# Patient Record
Sex: FEMALE | Race: BLACK OR AFRICAN AMERICAN | Employment: FULL TIME | URBAN - METROPOLITAN AREA
[De-identification: names, ages, dates, MRNs, and addresses within clinical notes are randomized per-mention and may not be internally consistent; named-entity substitution may affect disease eponyms.]

---

## 2019-06-13 PROBLEM — G44.209 TENSION TYPE HEADACHE: Status: ACTIVE | Noted: 2017-12-07

## 2019-06-13 PROBLEM — Z82.49 FAMILY HISTORY OF ANEURYSM OF BLOOD VESSEL OF BRAIN: Status: ACTIVE | Noted: 2017-12-07

## 2019-06-13 PROBLEM — L65.9 ALOPECIA: Status: ACTIVE | Noted: 2017-04-07

## 2019-08-05 ENCOUNTER — TELEPHONE (OUTPATIENT)
Dept: OBGYN CLINIC | Age: 49
End: 2019-08-05

## 2019-08-16 ENCOUNTER — HOSPITAL ENCOUNTER (OUTPATIENT)
Age: 49
Setting detail: SPECIMEN
Discharge: HOME | End: 2019-08-16
Payer: COMMERCIAL

## 2019-08-16 DIAGNOSIS — Z00.00 WELL ADULT EXAM: ICD-10-CM

## 2019-08-16 DIAGNOSIS — Z13.220 SCREENING, LIPID: ICD-10-CM

## 2019-08-16 DIAGNOSIS — Z13.1 SCREENING FOR DIABETES MELLITUS: ICD-10-CM

## 2019-08-16 LAB
ALBUMIN SERPL-MCNC: 4.3 G/DL (ref 3.5–5.2)
ALBUMIN/GLOBULIN RATIO: 1.5 (ref 1–2.5)
ALP BLD-CCNC: 75 U/L (ref 35–104)
ALT SERPL-CCNC: 33 U/L (ref 5–33)
ANION GAP SERPL CALCULATED.3IONS-SCNC: 10 MMOL/L (ref 9–17)
AST SERPL-CCNC: 23 U/L
BILIRUB SERPL-MCNC: 0.3 MG/DL (ref 0.3–1.2)
BUN BLDV-MCNC: 10 MG/DL (ref 6–20)
BUN/CREAT BLD: NORMAL (ref 9–20)
CALCIUM SERPL-MCNC: 9.6 MG/DL (ref 8.6–10.4)
CHLORIDE BLD-SCNC: 102 MMOL/L (ref 98–107)
CHOLESTEROL/HDL RATIO: 3.3
CHOLESTEROL: 187 MG/DL
CO2: 26 MMOL/L (ref 20–31)
CREAT SERPL-MCNC: 0.72 MG/DL (ref 0.5–0.9)
ESTIMATED AVERAGE GLUCOSE: 126 MG/DL
GFR AFRICAN AMERICAN: >60 ML/MIN
GFR NON-AFRICAN AMERICAN: >60 ML/MIN
GFR SERPL CREATININE-BSD FRML MDRD: NORMAL ML/MIN/{1.73_M2}
GFR SERPL CREATININE-BSD FRML MDRD: NORMAL ML/MIN/{1.73_M2}
GLUCOSE BLD-MCNC: 91 MG/DL (ref 70–99)
HBA1C MFR BLD: 6 % (ref 4–6)
HDLC SERPL-MCNC: 56 MG/DL
LDL CHOLESTEROL: 115 MG/DL (ref 0–130)
POTASSIUM SERPL-SCNC: 4.3 MMOL/L (ref 3.7–5.3)
SODIUM BLD-SCNC: 138 MMOL/L (ref 135–144)
TOTAL PROTEIN: 7.2 G/DL (ref 6.4–8.3)
TRIGL SERPL-MCNC: 78 MG/DL
VLDLC SERPL CALC-MCNC: NORMAL MG/DL (ref 1–30)

## 2020-01-22 ENCOUNTER — HOSPITAL ENCOUNTER (OUTPATIENT)
Age: 50
Setting detail: SPECIMEN
Discharge: HOME | End: 2020-01-22
Payer: COMMERCIAL

## 2020-01-22 LAB
ABSOLUTE EOS #: 0.1 K/UL (ref 0–0.44)
ABSOLUTE IMMATURE GRANULOCYTE: <0.03 K/UL (ref 0–0.3)
ABSOLUTE LYMPH #: 1.11 K/UL (ref 1.1–3.7)
ABSOLUTE MONO #: 0.29 K/UL (ref 0.1–1.2)
BASOPHILS # BLD: 1 % (ref 0–2)
BASOPHILS ABSOLUTE: 0.03 K/UL (ref 0–0.2)
DIFFERENTIAL TYPE: ABNORMAL
EOSINOPHILS RELATIVE PERCENT: 3 % (ref 1–4)
HCT VFR BLD CALC: 41.6 % (ref 36.3–47.1)
HEMOGLOBIN: 12.7 G/DL (ref 11.9–15.1)
IMMATURE GRANULOCYTES: 0 %
LYMPHOCYTES # BLD: 28 % (ref 24–43)
MCH RBC QN AUTO: 22.1 PG (ref 25.2–33.5)
MCHC RBC AUTO-ENTMCNC: 30.5 G/DL (ref 28.4–34.8)
MCV RBC AUTO: 72.5 FL (ref 82.6–102.9)
MONOCYTES # BLD: 7 % (ref 3–12)
NRBC AUTOMATED: 0 PER 100 WBC
PDW BLD-RTO: 17.3 % (ref 11.8–14.4)
PLATELET # BLD: ABNORMAL K/UL (ref 138–453)
PLATELET ESTIMATE: ABNORMAL
PLATELET, FLUORESCENCE: 306 K/UL (ref 138–453)
PLATELET, IMMATURE FRACTION: 4.9 % (ref 1.1–10.3)
PMV BLD AUTO: ABNORMAL FL (ref 8.1–13.5)
RBC # BLD: 5.74 M/UL (ref 3.95–5.11)
RBC # BLD: ABNORMAL 10*6/UL
SEG NEUTROPHILS: 61 % (ref 36–65)
SEGMENTED NEUTROPHILS ABSOLUTE COUNT: 2.41 K/UL (ref 1.5–8.1)
TSH SERPL DL<=0.05 MIU/L-ACNC: 0.69 MIU/L (ref 0.3–5)
WBC # BLD: 4 K/UL (ref 3.5–11.3)
WBC # BLD: ABNORMAL 10*3/UL

## 2021-03-29 ENCOUNTER — HOSPITAL ENCOUNTER (OUTPATIENT)
Age: 51
Setting detail: SPECIMEN
Discharge: HOME | End: 2021-03-29
Payer: COMMERCIAL

## 2021-04-02 LAB — CYTOLOGY REPORT: NORMAL

## 2022-11-09 ENCOUNTER — HOSPITAL ENCOUNTER (OUTPATIENT)
Dept: WOMENS IMAGING | Age: 52
Discharge: HOME | End: 2022-11-11
Payer: COMMERCIAL

## 2022-11-09 ENCOUNTER — HOSPITAL ENCOUNTER (OUTPATIENT)
Age: 52
Setting detail: SPECIMEN
Discharge: HOME | End: 2022-11-09

## 2022-11-09 DIAGNOSIS — Z12.31 ENCOUNTER FOR SCREENING MAMMOGRAM FOR MALIGNANT NEOPLASM OF BREAST: ICD-10-CM

## 2022-11-09 DIAGNOSIS — Z00.00 WELL ADULT HEALTH CHECK: ICD-10-CM

## 2022-11-09 DIAGNOSIS — I10 ESSENTIAL HYPERTENSION: ICD-10-CM

## 2022-11-09 LAB
ALBUMIN SERPL-MCNC: 4.4 G/DL (ref 3.5–5.2)
ALBUMIN/GLOBULIN RATIO: 1.8 (ref 1–2.5)
ALP BLD-CCNC: 80 U/L (ref 35–104)
ALT SERPL-CCNC: 13 U/L (ref 5–33)
ANION GAP SERPL CALCULATED.3IONS-SCNC: 12 MMOL/L (ref 9–17)
AST SERPL-CCNC: 21 U/L
BILIRUB SERPL-MCNC: 0.3 MG/DL (ref 0.3–1.2)
BUN BLDV-MCNC: 12 MG/DL (ref 6–20)
CALCIUM SERPL-MCNC: 9 MG/DL (ref 8.6–10.4)
CHLORIDE BLD-SCNC: 106 MMOL/L (ref 98–107)
CO2: 24 MMOL/L (ref 20–31)
CREAT SERPL-MCNC: 0.78 MG/DL (ref 0.5–0.9)
GFR SERPL CREATININE-BSD FRML MDRD: >60 ML/MIN/1.73M2
GLUCOSE BLD-MCNC: 79 MG/DL (ref 70–99)
HCT VFR BLD CALC: 43.8 % (ref 36.3–47.1)
HEMOGLOBIN: 13 G/DL (ref 11.9–15.1)
MCH RBC QN AUTO: 21.1 PG (ref 25.2–33.5)
MCHC RBC AUTO-ENTMCNC: 29.7 G/DL (ref 28.4–34.8)
MCV RBC AUTO: 71.1 FL (ref 82.6–102.9)
NRBC AUTOMATED: 0 PER 100 WBC
PDW BLD-RTO: 16.2 % (ref 11.8–14.4)
PLATELET # BLD: 355 K/UL (ref 138–453)
PMV BLD AUTO: 11.9 FL (ref 8.1–13.5)
POTASSIUM SERPL-SCNC: 4.5 MMOL/L (ref 3.7–5.3)
RBC # BLD: 6.16 M/UL (ref 3.95–5.11)
SODIUM BLD-SCNC: 142 MMOL/L (ref 135–144)
TOTAL PROTEIN: 6.9 G/DL (ref 6.4–8.3)
WBC # BLD: 2.7 K/UL (ref 3.5–11.3)

## 2022-11-09 PROCEDURE — 77063 BREAST TOMOSYNTHESIS BI: CPT

## 2022-11-17 ENCOUNTER — HOSPITAL ENCOUNTER (OUTPATIENT)
Dept: WOMENS IMAGING | Age: 52
Discharge: HOME | End: 2022-11-19
Payer: COMMERCIAL

## 2022-11-17 DIAGNOSIS — N63.20 MASS OF LEFT BREAST, UNSPECIFIED QUADRANT: ICD-10-CM

## 2022-11-17 PROCEDURE — 76642 ULTRASOUND BREAST LIMITED: CPT

## 2022-11-30 ENCOUNTER — HOSPITAL ENCOUNTER (OUTPATIENT)
Dept: WOMENS IMAGING | Age: 52
Discharge: HOME | End: 2022-12-02
Payer: COMMERCIAL

## 2022-11-30 VITALS — HEART RATE: 68 BPM | DIASTOLIC BLOOD PRESSURE: 112 MMHG | SYSTOLIC BLOOD PRESSURE: 176 MMHG

## 2022-11-30 VITALS — DIASTOLIC BLOOD PRESSURE: 101 MMHG | SYSTOLIC BLOOD PRESSURE: 174 MMHG | HEART RATE: 68 BPM

## 2022-11-30 DIAGNOSIS — R92.8 ABNORMALITY OF LEFT BREAST ON SCREENING MAMMOGRAM: ICD-10-CM

## 2022-11-30 PROCEDURE — A4648 IMPLANTABLE TISSUE MARKER: HCPCS

## 2022-11-30 PROCEDURE — 19083 BX BREAST 1ST LESION US IMAG: CPT

## 2022-12-01 ENCOUNTER — TELEPHONE (OUTPATIENT)
Dept: WOMENS IMAGING | Age: 52
End: 2022-12-01

## 2022-12-01 NOTE — TELEPHONE ENCOUNTER
Contacted patient post biopsy of the left breast.  She is working today. Has no questions or concerns. Aware she is to obtain the results of her biopsy from the office of Karri Miller.   Offered assistance to her in the future should she need any. / Electronically signed by MYNOR Torres CBIN on 12/1/2022 at 10:36 AM

## 2022-12-19 ENCOUNTER — OFFICE VISIT (OUTPATIENT)
Dept: SURGERY | Age: 52
End: 2022-12-19
Payer: COMMERCIAL

## 2022-12-19 ENCOUNTER — TELEPHONE (OUTPATIENT)
Dept: SURGERY | Age: 52
End: 2022-12-19

## 2022-12-19 VITALS
SYSTOLIC BLOOD PRESSURE: 144 MMHG | RESPIRATION RATE: 16 BRPM | WEIGHT: 158.4 LBS | TEMPERATURE: 97.7 F | HEIGHT: 63 IN | BODY MASS INDEX: 28.07 KG/M2 | DIASTOLIC BLOOD PRESSURE: 95 MMHG | HEART RATE: 81 BPM

## 2022-12-19 DIAGNOSIS — N63.42 SUBAREOLAR MASS OF LEFT BREAST: Primary | ICD-10-CM

## 2022-12-19 PROCEDURE — 99204 OFFICE O/P NEW MOD 45 MIN: CPT | Performed by: STUDENT IN AN ORGANIZED HEALTH CARE EDUCATION/TRAINING PROGRAM

## 2022-12-19 NOTE — PROGRESS NOTES
History and Physical -- General Surgery Clinic    Patient's Name/Date of Birth: Olivia Mccary / 1970 (24 y.o.)    Date: 2022     HPI: Olivia Mccray is a/an 46 y.o. female who presents to surgery clinic for evaluation of left breast. She undewent a screening mammogram on  which showed an enlarging mass anteriorly in the left breast. This was followed by an ultrasound showing a heterogenous mass in the 3-4 o'clock retroareolar position. She underwent a core needle biopsy by radiology, which identified a benign spindle cell proliferation with collagen us stroma and benign breast ducts and lobules. These findings were concerning for a possible myelofibroblastoma versus pseudoangiomatosis stromal hyperplasia. The mass has progressed in size since her previous biopsy in , and there is recommendations for closer monitoring with repeat imaging versus excisional biopsy. 20 was seen in the clinic, she denies any nipple discharge. She denies breast pain prior to the biopsy. She states she has not been able to feel the lump or mass. She was the age of 15 at menarche and has not gone through menopause yet. She has had 2 pregnancies with 1 live birth. She has breast cancer in her maternal grandmother and a cousin on her maternal side. She was on birth control for a very short period, is not on hormonal replacement.   She has had a previous biopsy in the past.      Past Medical History:   Diagnosis Date    Hypertension        Past Surgical History:   Procedure Laterality Date    BREAST BIOPSY       SECTION      SKIN GRAFT Right     Right forearm    US BREAST NEEDLE BIOPSY LEFT Left 2022    US BREAST NEEDLE BIOPSY LEFT 2022 [facility]       Current Outpatient Medications   Medication Sig Dispense Refill    amLODIPine (NORVASC) 2.5 MG tablet TAKE ONE TABLET BY MOUTH DAILY 90 tablet 1    Multiple Vitamins-Minerals (THERAPEUTIC MULTIVITAMIN-MINERALS) tablet Take 1 tablet by mouth daily       No current facility-administered medications for this visit. No Known Allergies    Family History   Problem Relation Age of Onset    Stroke Mother     High Blood Pressure Mother     Diabetes Father     Other Sister         aneursym - veg state    No Known Problems Brother     Breast Cancer Maternal Grandmother     Breast Cancer Maternal Cousin        Social History     Socioeconomic History    Marital status:      Spouse name: Not on file    Number of children: Not on file    Years of education: Not on file    Highest education level: Not on file   Occupational History    Not on file   Tobacco Use    Smoking status: Never    Smokeless tobacco: Never   Substance and Sexual Activity    Alcohol use: Never    Drug use: Never    Sexual activity: Yes     Partners: Male   Other Topics Concern    Not on file   Social History Narrative    Not on file     Social Determinants of Health     Financial Resource Strain: Low Risk     Difficulty of Paying Living Expenses: Not hard at all   Food Insecurity: No Food Insecurity    Worried About Running Out of Food in the Last Year: Never true    [address] in the Last Year: Never true   Transportation Needs: No Transportation Needs    Lack of Transportation (Medical): No    Lack of Transportation (Non-Medical): No   Physical Activity: Not on file   Stress: Not on file   Social Connections: Not on file   Intimate Partner Violence: Not on file   Housing Stability: Not on file       ROS:   GEN: Denies fevers, chills, changes in weight. HEENT: No rhinorrhea, dysphagia, odynphagia. CV: No recent chest pain. RESP: Denies shortness of breath, COPD, asthma. BREAST: Left breast mass in the retroareolar region status postbiopsy. GI: Denies constipation, diarrhea, abdominal pain. : Denies increased frequency or dysuria. HEM[de-identified] Denies history of anemia or DVTs. ENDO: Denies history of thyroid problems or diabetes.    NEURO: Denies history of stroke. PHYSICAL EXAM:    Vitals:    12/19/22 0810   BP: (!) 144/95   Pulse: 81   Resp: 16   Temp: 97.7 °F (36.5 °C)       GEN: Alert and oriented x 3. No apparent distress. HEENT: Non-traumatic, pupils equal and reactive, EOMI bilaterally  BREAST: Bilateral breasts are symmetrical without evidence of nipple discharge. There is no architectural changes bilaterally. There is small amount of ecchymosis noted in the left lateral breast from previous biopsy which is slightly tender, but no palpable mass identified on exam.  CV: Regular rate and rhythm  RESP: No respiratory distress or tachypnea. Clear breath sounds bilaterally. ABDOMEN: Soft, non-distended, non-tender. EXT: No cyanosis or edema noted  SKIN: No skin lesions or changes noted. NEURO: CN II-XII grossly intact. No motor or sensory deficits appreciated. IMAGING:    Narrative   EXAMINATION:   TARGETED ULTRASOUND OF THE LEFT BREAST       11/17/2022       COMPARISON:   Mammogram dated 11/09/2022 and 05/31/2017. Ultrasound dated 02/21/2014. 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       FINDINGS:   Targeted ultrasound imaging was performed in the retroareolar left breast.   In the 3-4 o'clock retroareolar left breast, there is an oval    circumscribed   mass measuring 3.5 x 1.6 x 2.3 cm (previously 0.9 x 0.7 x 0.9 cm on   02/21/2014). The mass is heterogeneous with areas of increased    echogenicity   suggesting fat. There is no specific posterior acoustic features. Small   amount of internal vascular flow is demonstrated. Biopsy clip from the   previous biopsy is not well visualized on ultrasound. No left axillary   lymphadenopathy.            Impression   Heterogeneous mass in the 3-4 o'clock retroareolar left breast still has   imaging features suggestive of a fibroadenoma (previously

## 2022-12-19 NOTE — TELEPHONE ENCOUNTER
Spoke with [facility] [phone]. Patient scheduled for QUIQUE localizer on 01/12/23 at [address] [phone].  Patient scheduled for biopsy of left breast with localizer guidance on 01/19/23 at [address]. Patient arrival time at 6AM. Patient scheduled for in-person PAT on 01/12/22 at 10:30AM.

## 2023-01-12 ENCOUNTER — HOSPITAL ENCOUNTER (OUTPATIENT)
Dept: PREADMISSION TESTING | Age: 53
Discharge: HOME | End: 2023-01-12
Payer: COMMERCIAL

## 2023-01-12 ENCOUNTER — HOSPITAL ENCOUNTER (OUTPATIENT)
Dept: WOMENS IMAGING | Age: 53
Discharge: HOME | End: 2023-01-14
Payer: COMMERCIAL

## 2023-01-12 VITALS
HEART RATE: 86 BPM | BODY MASS INDEX: 30.36 KG/M2 | HEIGHT: 62 IN | DIASTOLIC BLOOD PRESSURE: 93 MMHG | OXYGEN SATURATION: 100 % | WEIGHT: 165 LBS | SYSTOLIC BLOOD PRESSURE: 144 MMHG | RESPIRATION RATE: 16 BRPM | TEMPERATURE: 97 F

## 2023-01-12 DIAGNOSIS — N63.42 SUBAREOLAR MASS OF LEFT BREAST: ICD-10-CM

## 2023-01-12 DIAGNOSIS — Z01.818 PREOP EXAMINATION: ICD-10-CM

## 2023-01-12 DIAGNOSIS — R92.8 ABNORMAL MAMMOGRAM: ICD-10-CM

## 2023-01-12 PROBLEM — M79.671 PAIN IN RIGHT FOOT: Status: ACTIVE | Noted: 2023-01-12

## 2023-01-12 PROBLEM — S92.515A CLOSED NONDISPLACED FRACTURE OF PROXIMAL PHALANX OF LESSER TOE OF LEFT FOOT: Status: ACTIVE | Noted: 2023-01-12

## 2023-01-12 PROBLEM — M72.2 PLANTAR FASCIITIS: Status: ACTIVE | Noted: 2023-01-12

## 2023-01-12 PROBLEM — M79.675 PAIN IN TOE OF LEFT FOOT: Status: ACTIVE | Noted: 2023-01-12

## 2023-01-12 PROBLEM — M92.61 HAGLUND'S DEFORMITY OF RIGHT HEEL: Status: ACTIVE | Noted: 2023-01-12

## 2023-01-12 LAB
ABSOLUTE EOS #: 0.15 K/UL (ref 0–0.4)
ABSOLUTE LYMPH #: 0.9 K/UL (ref 1–4.8)
ABSOLUTE MONO #: 0.17 K/UL (ref 0.1–1.3)
ANION GAP SERPL CALCULATED.3IONS-SCNC: 10 MMOL/L (ref 9–17)
BASOPHILS # BLD: 0 % (ref 0–2)
BASOPHILS ABSOLUTE: 0 K/UL (ref 0–0.2)
BUN BLDV-MCNC: 9 MG/DL (ref 6–20)
CALCIUM SERPL-MCNC: 9 MG/DL (ref 8.6–10.4)
CHLORIDE BLD-SCNC: 102 MMOL/L (ref 98–107)
CO2: 27 MMOL/L (ref 20–31)
CREAT SERPL-MCNC: 0.74 MG/DL (ref 0.5–0.9)
EOSINOPHILS RELATIVE PERCENT: 5 % (ref 0–4)
GFR SERPL CREATININE-BSD FRML MDRD: >60 ML/MIN/1.73M2
GLUCOSE BLD-MCNC: 91 MG/DL (ref 70–99)
HCT VFR BLD CALC: 38.6 % (ref 36–46)
HEMOGLOBIN: 12.3 G/DL (ref 12–16)
LYMPHOCYTES # BLD: 31 % (ref 24–44)
MCH RBC QN AUTO: 21.1 PG (ref 26–34)
MCHC RBC AUTO-ENTMCNC: 31.9 G/DL (ref 31–37)
MCV RBC AUTO: 66 FL (ref 80–100)
MONOCYTES # BLD: 6 % (ref 1–7)
MORPHOLOGY: ABNORMAL
PDW BLD-RTO: 15.3 % (ref 11.5–14.9)
PLATELET # BLD: 333 K/UL (ref 150–450)
PMV BLD AUTO: 9.5 FL (ref 6–12)
POTASSIUM SERPL-SCNC: 4.1 MMOL/L (ref 3.7–5.3)
RBC # BLD: 5.85 M/UL (ref 4–5.2)
SEG NEUTROPHILS: 58 % (ref 36–66)
SEGMENTED NEUTROPHILS ABSOLUTE COUNT: 1.68 K/UL (ref 1.3–9.1)
SODIUM BLD-SCNC: 139 MMOL/L (ref 135–144)
TSH SERPL DL<=0.05 MIU/L-ACNC: 0.79 UIU/ML (ref 0.3–5)
WBC # BLD: 2.9 K/UL (ref 3.5–11)

## 2023-01-12 PROCEDURE — 80048 BASIC METABOLIC PNL TOTAL CA: CPT

## 2023-01-12 PROCEDURE — 19285 PERQ DEV BREAST 1ST US IMAG: CPT

## 2023-01-12 PROCEDURE — A4648 IMPLANTABLE TISSUE MARKER: HCPCS

## 2023-01-12 PROCEDURE — 36415 COLL VENOUS BLD VENIPUNCTURE: CPT

## 2023-01-12 PROCEDURE — 84443 ASSAY THYROID STIM HORMONE: CPT

## 2023-01-12 PROCEDURE — 85025 COMPLETE CBC W/AUTO DIFF WBC: CPT

## 2023-01-12 PROCEDURE — APPSS45 APP SPLIT SHARED TIME 31-45 MINUTES: Performed by: NURSE PRACTITIONER

## 2023-01-12 PROCEDURE — 93005 ELECTROCARDIOGRAM TRACING: CPT | Performed by: ANESTHESIOLOGY

## 2023-01-12 RX ORDER — DIMENHYDRINATE 50 MG
1000 TABLET ORAL DAILY
COMMUNITY

## 2023-01-12 RX ORDER — ASPIRIN 81 MG/1
81 TABLET ORAL DAILY PRN
COMMUNITY

## 2023-01-12 ASSESSMENT — ENCOUNTER SYMPTOMS
RHINORRHEA: 0
BLOOD IN STOOL: 0
CONSTIPATION: 0
DIARRHEA: 0
NAUSEA: 0
VOMITING: 0
TROUBLE SWALLOWING: 0
ABDOMINAL PAIN: 0
SORE THROAT: 0
WHEEZING: 0
ANAL BLEEDING: 0
COUGH: 0
SHORTNESS OF BREATH: 0

## 2023-01-12 NOTE — H&P
HISTORY and [id number]       NAME:  Annabella Fam  MRN: [id number]   YOB: 1970   Date: 1/12/2023   Age: 46 y.o. Gender: female     COMPLAINT AND PRESENT HISTORY:   Annabella Fam is 46 y.o.,  female, presents for pre-anesthesia/admission testing for BREAST LESION BIOPSY EXCISION NEEDLE LOCALIZATION NEEDLE LOC DONE ON 1/12/23 (LEFT) per Dr. Tami Vidal. Primary dx: LEFT BREAST MASS. HPI:  SEE PORTION OF NOTE BELOW PER DR. VILLALOBOS, 12-19-22 (REVIEWED):  \"HPI: Annabella Fam is a/an 46 y.o. female who presents to surgery clinic for evaluation of left breast. She undewent a screening mammogram on 11/09 which showed an enlarging mass anteriorly in the left breast. This was followed by an ultrasound showing a heterogenous mass in the 3-4 o'clock retroareolar position. She underwent a core needle biopsy by radiology, which identified a benign spindle cell proliferation with collagen us stroma and benign breast ducts and lobules. These findings were concerning for a possible myelofibroblastoma versus pseudoangiomatosis stromal hyperplasia. The mass has progressed in size since her previous biopsy in 2014, and there is recommendations for closer monitoring with repeat imaging versus excisional biopsy. 20 was seen in the clinic, she denies any nipple discharge. She denies breast pain prior to the biopsy. She states she has not been able to feel the lump or mass. She was the age of 15 at menarche and has not gone through menopause yet. She has had 2 pregnancies with 1 live birth. She has breast cancer in her maternal grandmother and a cousin on her maternal side. She was on birth control for a very short period, is not on hormonal replacement.   She has had a previous biopsy in the past.     ASSESSMENT:  Left breast subareolar mass with benign spindle cell proliferation with collagenous stroma     PLAN:  I discussed with the patient in detail of the pathology findings. I discussed the options of close imaging monitoring with a repeat image in 6 months versus proceeding with excisional biopsy of the breast lesion. We discussed the risks including but not limited to infection, bleeding, not obtaining the entire mass, anesthesia. I discussed the intentions of the biopsy and although this is likely benign, this would give us 100% definitive answer on what type of lesion the mass is, especially in the setting of increasing in size. The patient requested to proceed with surgery. A formal written consent was obtained and placed the patient's chart. Will undergo a localizer placement and excisional biopsy. We will follow-up with the patient postoperatively. \"    UPDATE: Denies any significant changes since above note. Procedure completed today just prior to PAT appointment was tolerated well, with no known complications per patient (RF transmitter localization performed for lumpectomy- see report below). MONSTER POST BX CLIP PLACEMENT LEFT, 1-12-23:  Impression   RF transmitter localization performed for lumpectomy. Successful placement. RF TRANSMITTER SERIAL NUMBER: [id number]       RECOMMENDATIONS:   Zw histology pending     RECENT IMAGING/PATHOLOGY R/T HPI   SEE [facility] FOR IMAGING DETAIL. Component 11/30/22 1420    Surgical Pathology Report -- Diagnosis --   LEFT BREAST, 3-4:00 RETROAREOLAR, ULTRASOUND-GUIDED CORE NEEDLE   BIOPSIES:             -  BENIGN SPINDLE CELL PROLIFERATION WITH COLLAGENOUS STROMA   AND BENIGN BREAST DUCTS AND LOBULES.        -  SEE COMMENT. -- Diagnosis Comment --   THE FINDINGS ARE SUGGESTIVE OF A BENIGN PROCESS, SUCH AS A   MYOFIBROBLASTOMA (ALTHOUGH THE NEGATIVE CD34 IS UNUSUAL). THE   DIFFERENTIAL DIAGNOSIS COULD INCLUDE PSEUDOANGIOMATOUS STROMAL   HYPERPLASIA WITHIN A MAMMARY HAMARTOMA, HOWEVER, DIAGNOSIS ON CORE   BIOPSY IS LIMITED. CONSERVATIVE, BUT COMPLETE, EXCISION IS   RECOMMENDED FOR DEFINITIVE CLASSIFICATION.        Georgia Hahn Bertha Aguiar M.D. Electronically Signed Out       jet/2022      Review of additional significant medical hx:  HTN: She does take amlodipine as prescribed. States BP is typically slightly elevated. Denies current/recent chest pain, palpitations, SOB, dizziness, leg swelling, headaches noted occasionally, states usually associated w/PMS- denies currently. Discussed significant family medical hx of brain aneurysm- states she has been checked for this in the past. She does not follow w/cardiology. Note: Very slight heart murmur noted on exam (see exam below), patient denies any known personal hx of heart murmur, but does state that heart murmur was noted w/her maternal aunt- advised f/u with PCP. Current medications r/t condition: AMLODIPINE    BP Readings from Last 3 Encounters:   23 (!) 144/93   22 (!) 144/95   22 (!) 174/101      MRA head without contrast, 18 (per [employer]): Impression:     No evidence of large vessel vascular occlusion or intracranial aneurysm identified within the limitations of the MR angiographic technique as discussed above. Workstation:[id number]     Finalized by Karyle Meadow, MD on 2018 2:25 AM    Activity level: Patient states she is active, no routine exercise. Functional Capacity per patient:              1. Patient IS able to walk 2 city blocks on level ground without SOB. 2. Patient IS able to climb 2 flights of stairs without SOB. Denies hx of MRSA infection. Denies hx of blood clots. Denies hx of any personal or family hx of complications w/anesthesia.    PAST MEDICAL HISTORY     Past Medical History:   Diagnosis Date    Hypertension     Left breast mass     Pneumonia     Patient states hx of hospitalization    Snoring     UTI (urinary tract infection)        SURGICAL HISTORY       Past Surgical History:   Procedure Laterality Date    BREAST BIOPSY Left      SECTION      COLONOSCOPY  2020 negative- Procedure: COLONOSCOPY; Surgeon: Benitez Patino MD; Location: [facility] ENDOSCOPY; Service: Gastroenterology; Laterality: N/A;    OTHER SURGICAL HISTORY Left 01/12/2023    RF transmitter localization performed for lumpectomy    SKIN GRAFT Right     Right forearm r/t washing machine accident    US BREAST NEEDLE BIOPSY LEFT Left 11/30/2022    US BREAST NEEDLE BIOPSY LEFT 11/30/2022 [facility] [address]    WISDOM TOOTH EXTRACTION         SOCIAL HISTORY       Social History     Socioeconomic History    Marital status:      Spouse name: None    Number of children: None    Years of education: None    Highest education level: None   Tobacco Use    Smoking status: Never    Smokeless tobacco: Never   Vaping Use    Vaping Use: Never used   Substance and Sexual Activity    Alcohol use: Never    Drug use: Never    Sexual activity: Yes     Partners: Male     Social Determinants of Health     Financial Resource Strain: Low Risk     Difficulty of Paying Living Expenses: Not hard at all   Food Insecurity: No Food Insecurity    Worried About Running Out of Food in the Last Year: Never true    Ran Out of Food in the Last Year: Never true   Transportation Needs: No Transportation Needs    Lack of Transportation (Medical): No    Lack of Transportation (Non-Medical): No       REVIEW OF SYSTEMS    No Known Allergies    Current Outpatient Medications on File Prior to Encounter   Medication Sig Dispense Refill    Flaxseed, Linseed, (FLAX SEED OIL) 1000 MG CAPS Take 1,000 mg by mouth daily      aspirin 81 MG EC tablet Take 81 mg by mouth daily as needed for Pain      amLODIPine (NORVASC) 2.5 MG tablet TAKE ONE TABLET BY MOUTH DAILY 90 tablet 1    Multiple Vitamins-Minerals (THERAPEUTIC MULTIVITAMIN-MINERALS) tablet Take 1 tablet by mouth daily       No current facility-administered medications on file prior to encounter. Review of Systems   Constitutional:  Negative for chills and fever.    HENT:  Negative for congestion, ear pain, rhinorrhea, sore throat and trouble swallowing. Respiratory:  Negative for cough, shortness of breath and wheezing. Apnea: + snoring- advised f/u with PCP- states she already has. Cardiovascular:  Negative for chest pain, palpitations and leg swelling. Gastrointestinal:  Negative for abdominal pain, anal bleeding, blood in stool, constipation, diarrhea, nausea and vomiting. Genitourinary:  Negative for dysuria and frequency. Skin:  Negative for rash and wound. Neurological:  Negative for dizziness and headaches (Hx of). Hematological:  Does not bruise/bleed easily. GENERAL PHYSICAL EXAM     Vitals: BP (!) 144/93   Pulse 86   Temp 97 °F (36.1 °C) (Infrared)   Resp 16   Ht 5' 2\" (1.575 m)   Wt 165 lb (74.8 kg)   LMP 01/12/2023 (Exact Date)   SpO2 100%   BMI 30.18 kg/m²               Physical Exam  Vitals reviewed. Constitutional:       General: She is not in acute distress. Appearance: She is well-developed. She is not ill-appearing, toxic-appearing or diaphoretic. HENT:      Head: Normocephalic. Right Ear: External ear normal.      Left Ear: External ear normal.      Nose: Nose normal.      Mouth/Throat:      Pharynx: No oropharyngeal exudate or posterior oropharyngeal erythema. Tonsils: No tonsillar abscesses. Eyes:      General:         Right eye: No discharge. Left eye: No discharge. Conjunctiva/sclera: Conjunctivae normal.      Pupils: Pupils are equal, round, and reactive to light. Neck:      Thyroid: Thyromegaly (Possible very mild thyromegaly noted- TSH w/reflex T4 added on to PAT lab work- advised f/u with PCP) present. Cardiovascular:      Rate and Rhythm: Normal rate and regular rhythm. Pulses: Intact distal pulses. Heart sounds: Murmur heard. Systolic murmur is present with a grade of 1/6. Pulmonary:      Effort: Pulmonary effort is normal. No accessory muscle usage or respiratory distress.       Breath sounds: Normal breath sounds. No decreased breath sounds, wheezing, rhonchi or rales. Chest:      Comments: Breast exam deferred to surgeon. Abdominal:      General: Bowel sounds are normal. There is no distension. Palpations: Abdomen is soft. There is no mass. Tenderness: There is no abdominal tenderness. There is no guarding or rebound. Musculoskeletal:      Right lower leg: No swelling or tenderness. No edema. Left lower leg: No swelling or tenderness. No edema. Comments: Negative Nima's sign b/l (performed in sitting position). Lymphadenopathy:      Cervical: No cervical adenopathy. Skin:     General: Skin is warm and dry. Neurological:      Mental Status: She is alert and oriented to person, place, and time. Psychiatric:         Behavior: Behavior normal.       LAB REVIEW     Component Ref Range & Units 1/12/23 1100 11/9/22 0750 8/16/19 1117   Glucose 70 - 99 mg/dL 91  79  91    BUN 6 - 20 mg/dL 9  12  10    Creatinine 0.50 - 0.90 mg/dL 0.74  0.78  0.72    Est, Glom Filt Rate >60 mL/min/1.73m2 >60  >60 CM     Comment:        Effective Oct 3, 2022         These results are not intended for use in patients <25years of age. eGFR results are calculated without a race factor using the 2021 CKD-EPI equation. Careful clinical correlation is recommended, particularly when comparing to results   calculated using previous equations. The CKD-EPI equation is less accurate in patients with extremes of muscle mass, extra-renal   metabolism of creatine, excessive creatine ingestion, or following therapy that affects   renal tubular secretion.     Calcium 8.6 - 10.4 mg/dL 9.0  9.0  9.6    Sodium 135 - 144 mmol/L 139  142  138    Potassium 3.7 - 5.3 mmol/L 4.1  4.5  4.3    Chloride 98 - 107 mmol/L 102  106  102    CO2 20 - 31 mmol/L 27  24  26    Anion Gap 9 - 17 mmol/L 10  12  10    Alkaline Phosphatase   80 R  75 R    GFR     >60 R    GFR Comment         CM    GFR Staging    NOT REPORTED    ALT   13 R  33 R    AST   21 R  23 R    Total Bilirubin   0.3 R  0.30 R    Total Protein   6.9 R  7.2 R    Albumin   4.4 R  4.3 R    Albumin/Globulin Ratio   1.8 R  1.5 R    Bun/Cre Ratio    NOT REPORTED R    GFR Non-    >60 R    Resulting Agency  [address]     Component Ref Range & Units 1/12/23 1100 11/9/22 0750 1/22/20 1137   WBC 3.5 - 11.0 k/uL 2.9 Low   2.7 Low  R  4.0 R    RBC 4.0 - 5.2 m/uL 5.85 High   6.16 High  R  5.74 High  R    Hemoglobin 12.0 - 16.0 g/dL 12.3  13.0 R  12.7 R    Hematocrit 36 - 46 % 38.6  43.8 R  41.6 R    MCV 80 - 100 fL 66.0 Low   71.1 Low  R  72.5 Low  R    MCH 26 - 34 pg 21.1 Low   21.1 Low  R  22.1 Low  R    MCHC 31 - 37 g/dL 31.9  29.7 R  30.5 R    RDW 11.5 - 14.9 % 15.3 High   16.2 High  R  17.3 High  R    Platelets 442 - 299 k/uL 333  355 R  See Reflexed IPF Result R    MPV 6.0 - 12.0 fL 9.5  11.9 R  NOT REPORTED R    Seg Neutrophils 36 - 66 % 58   61 R    Lymphocytes 24 - 44 % 31   28 R    Monocytes 1 - 7 % 6   7 R    Eosinophils % 0 - 4 % 5 High    3 R    Basophils 0 - 2 % 0   1    Segs Absolute 1.3 - 9.1 k/uL 1.68   2.41 R    Absolute Lymph # 1.0 - 4.8 k/uL 0.90 Low    1.11 R    Absolute Mono # 0.1 - 1.3 k/uL 0.17   0.29 R    Absolute Eos # 0.0 - 0.4 k/uL 0.15   0.10 R    Basophils Absolute 0.0 - 0.2 k/uL 0.00   0.03 R    Morphology  ANISOCYTOSIS PRESENT      Morphology  MICROCYTOSIS PRESENT      Morphology  HYPOCHROMIA PRESENT      NRBC Automated   0.0 R  0.0 R    Differential Type    NOT REPORTED    WBC Morphology    NOT REPORTED    RBC Morphology    ANISOCYTOSIS PRESENT CM    Platelet Estimate    NOT REPORTED    Immature Granulocytes    0 R    Absolute Immature Granulocyte    <0.03 R    Resulting Agency  [address]     Component Ref Range & Units 1/12/23 1100 1/22/20 1137   TSH 0.30 - 5. 00 uIU/mL 0.79  0.69 [facility]  [address] [place] [address] 153     PRELIMINARY EKG REVIEW, DATE: 1-12-23     NSR  POSSIBLE LEFT ATRIAL ENLARGEMENT  BORDERLINE ECG  68 BPM    Note: Advised patient to f/u with PCP regarding today's \"borderline ECG\". 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 See Care Everywhere for additional detail. Preliminary EKG, CBC, BMP completed today has been previewed per myself. CLEARANCE: Medical clearance w/patient's PCP, Dr. Catherine Ham is scheduled for 1-17-23. Based on my personal evaluation of patient including review of patient's chart, medical clearance required for scheduled surgery d/t the following issues which are discussed in H&P above:  - HTN  - BORDERLINE ECG FINDINGS, 1-12-23 \"NSR, POSSIBLE LEFT ATRIAL ENLARGEMENT, BORDERLINE ECG, 68 BPM\"  - HX OF PNA  - HX OF SNORING  - SLIGHT HEART MURMUR, POSSIBLE MILD THYROMEGALY NOTED ON EXAM 1-12-23  - OBESITY  - WBC- 2.9 (1-12-23)  Dr. Kris Kang office who will be responsible for making sure the clearance is obtained and is in the chart for surgery.     Patient does have chart within [place] [id number] for further detail (denies known hx of Lyme disease, hives as noted per [facility] chart).     Total time spent on encounter- PAT provider minutes: 31-40 minutes     TEZ Serna - CNP on 1/12/2023 at 4:53 PM

## 2023-01-12 NOTE — DISCHARGE INSTRUCTIONS
Pre-op Instructions For Out-Patient Surgery    Medication Instructions:  Please stop herbs and any supplements now (includes vitamins and minerals). Please contact your surgeon and prescribing physician for pre-op instructions for any blood thinners. If you have inhalers/aerosol treatments at home, please use them the morning of your surgery and bring the inhalers with you to the hospital.    Please take the following medications the morning of your surgery with a sip of water:    amlodipine    Surgery Instructions:  After midnight before surgery:  Do not eat or drink anything, including water, mints, gum, and hard candy. You may brush your teeth without swallowing. No smoking, chewing tobacco, or street drugs. Please shower or bathe before surgery. If you were given Surgical Scrub Chlorhexidine Gluconate Liquid (CHG), please shower the night before and the morning of your surgery following the detailed instructions you received during your pre-admission visit. Please do not wear any cologne, lotion, powder, deodorant, jewelry, piercings, perfume, makeup, nail polish, hair accessories, or hair spray on the day of surgery. Wear loose comfortable clothing. Leave your valuables at home. Bring a storage case for any glasses/contacts. An adult who is responsible for you MUST drive you home and should be with you for the first 24 hours after surgery. If having out-patient knee and foot surgeries, please arrange for planned crutches, walker, or wheelchair before arriving to the hospital.    The Day of Surgery:  Arrive at [facility] AT [facility] Surgery Entrance at the time directed by your surgeon and check in at the desk. If you have a living will or healthcare power of , please bring a copy. You will be taken to the pre-op holding area where you will be prepared for surgery.   A physical assessment will be performed by a nurse practitioner or house officer. Your IV will be started and you will meet your anesthesiologist.    When you go to surgery, your family will be directed to the surgical waiting room, where the doctor should speak with them after your surgery. After surgery, you will be taken to the recovery room then when you are awake and stable you will go to the short stay unit for preparation to be discharged. If you use a Bi-PAP or C-PAP machine, please bring it with you and leave it in the car in case it is needed in recovery room.

## 2023-01-12 NOTE — H&P (VIEW-ONLY)
HISTORY and [id number]       NAME:  Jovan Lorenzo  MRN: [id number]   YOB: 1970   Date: 1/12/2023   Age: 46 y.o. Gender: female     COMPLAINT AND PRESENT HISTORY:   Jovan Lorenzo is 46 y.o.,  female, presents for pre-anesthesia/admission testing for BREAST LESION BIOPSY EXCISION NEEDLE LOCALIZATION NEEDLE LOC DONE ON 1/12/23 (LEFT) per Dr. Taylor Loja. Primary dx: LEFT BREAST MASS. HPI:  SEE PORTION OF NOTE BELOW PER DR. VILLALOBOS, 12-19-22 (REVIEWED):  \"HPI: Jovan Lorenzo is a/an 46 y.o. female who presents to surgery clinic for evaluation of left breast. She undewent a screening mammogram on 11/09 which showed an enlarging mass anteriorly in the left breast. This was followed by an ultrasound showing a heterogenous mass in the 3-4 o'clock retroareolar position. She underwent a core needle biopsy by radiology, which identified a benign spindle cell proliferation with collagen us stroma and benign breast ducts and lobules. These findings were concerning for a possible myelofibroblastoma versus pseudoangiomatosis stromal hyperplasia. The mass has progressed in size since her previous biopsy in 2014, and there is recommendations for closer monitoring with repeat imaging versus excisional biopsy. 20 was seen in the clinic, she denies any nipple discharge. She denies breast pain prior to the biopsy. She states she has not been able to feel the lump or mass. She was the age of 15 at menarche and has not gone through menopause yet. She has had 2 pregnancies with 1 live birth. She has breast cancer in her maternal grandmother and a cousin on her maternal side. She was on birth control for a very short period, is not on hormonal replacement.   She has had a previous biopsy in the past.     ASSESSMENT:  Left breast subareolar mass with benign spindle cell proliferation with collagenous stroma     PLAN:  I discussed with the patient in detail of the pathology findings. I discussed the options of close imaging monitoring with a repeat image in 6 months versus proceeding with excisional biopsy of the breast lesion. We discussed the risks including but not limited to infection, bleeding, not obtaining the entire mass, anesthesia. I discussed the intentions of the biopsy and although this is likely benign, this would give us 100% definitive answer on what type of lesion the mass is, especially in the setting of increasing in size. The patient requested to proceed with surgery. A formal written consent was obtained and placed the patient's chart. Will undergo a localizer placement and excisional biopsy. We will follow-up with the patient postoperatively. \"    UPDATE: Denies any significant changes since above note. Procedure completed today just prior to PAT appointment was tolerated well, with no known complications per patient (RF transmitter localization performed for lumpectomy- see report below). MONSTER POST BX CLIP PLACEMENT LEFT, 1-12-23:  Impression   RF transmitter localization performed for lumpectomy. Successful placement. RF TRANSMITTER SERIAL NUMBER: [id number]       RECOMMENDATIONS:   Zw histology pending     RECENT IMAGING/PATHOLOGY R/T HPI   SEE [facility] FOR IMAGING DETAIL. Component 11/30/22 1420    Surgical Pathology Report -- Diagnosis --   LEFT BREAST, 3-4:00 RETROAREOLAR, ULTRASOUND-GUIDED CORE NEEDLE   BIOPSIES:             -  BENIGN SPINDLE CELL PROLIFERATION WITH COLLAGENOUS STROMA   AND BENIGN BREAST DUCTS AND LOBULES.        -  SEE COMMENT. -- Diagnosis Comment --   THE FINDINGS ARE SUGGESTIVE OF A BENIGN PROCESS, SUCH AS A   MYOFIBROBLASTOMA (ALTHOUGH THE NEGATIVE CD34 IS UNUSUAL). THE   DIFFERENTIAL DIAGNOSIS COULD INCLUDE PSEUDOANGIOMATOUS STROMAL   HYPERPLASIA WITHIN A MAMMARY HAMARTOMA, HOWEVER, DIAGNOSIS ON CORE   BIOPSY IS LIMITED. CONSERVATIVE, BUT COMPLETE, EXCISION IS   RECOMMENDED FOR DEFINITIVE CLASSIFICATION.        Ronnie Rao Malathi Soares M.D. Electronically Signed Out       jet/2022      Review of additional significant medical hx:  HTN: She does take amlodipine as prescribed. States BP is typically slightly elevated. Denies current/recent chest pain, palpitations, SOB, dizziness, leg swelling, headaches noted occasionally, states usually associated w/PMS- denies currently. Discussed significant family medical hx of brain aneurysm- states she has been checked for this in the past. She does not follow w/cardiology. Note: Very slight heart murmur noted on exam (see exam below), patient denies any known personal hx of heart murmur, but does state that heart murmur was noted w/her maternal aunt- advised f/u with PCP. Current medications r/t condition: AMLODIPINE    BP Readings from Last 3 Encounters:   23 (!) 144/93   22 (!) 144/95   22 (!) 174/101      MRA head without contrast, 18 (per [facility]): Impression:     No evidence of large vessel vascular occlusion or intracranial aneurysm identified within the limitations of the MR angiographic technique as discussed above. Workstation:[id number]     Finalized by Carmen Saucedo MD on 2018 2:25 AM    Activity level: Patient states she is active, no routine exercise. Functional Capacity per patient:              1. Patient IS able to walk 2 city blocks on level ground without SOB. 2. Patient IS able to climb 2 flights of stairs without SOB. Denies hx of MRSA infection. Denies hx of blood clots. Denies hx of any personal or family hx of complications w/anesthesia.    PAST MEDICAL HISTORY     Past Medical History:   Diagnosis Date    Hypertension     Left breast mass     Pneumonia     Patient states hx of hospitalization    Snoring     UTI (urinary tract infection)        SURGICAL HISTORY       Past Surgical History:   Procedure Laterality Date    BREAST BIOPSY Left      SECTION      COLONOSCOPY  2020 negative- Procedure: COLONOSCOPY; Surgeon: Gray Traore MD; Location: [facility] ENDOSCOPY; Service: Gastroenterology; Laterality: N/A;    OTHER SURGICAL HISTORY Left 01/12/2023    RF transmitter localization performed for lumpectomy    SKIN GRAFT Right     Right forearm r/t washing machine accident    US BREAST NEEDLE BIOPSY LEFT Left 11/30/2022    US BREAST NEEDLE BIOPSY LEFT 11/30/2022 [facility] [address]    WISDOM TOOTH EXTRACTION         SOCIAL HISTORY       Social History     Socioeconomic History    Marital status:      Spouse name: None    Number of children: None    Years of education: None    Highest education level: None   Tobacco Use    Smoking status: Never    Smokeless tobacco: Never   Vaping Use    Vaping Use: Never used   Substance and Sexual Activity    Alcohol use: Never    Drug use: Never    Sexual activity: Yes     Partners: Male     Social Determinants of Health     Financial Resource Strain: Low Risk     Difficulty of Paying Living Expenses: Not hard at all   Food Insecurity: No Food Insecurity    Worried About Running Out of Food in the Last Year: Never true    Ran Out of Food in the Last Year: Never true   Transportation Needs: No Transportation Needs    Lack of Transportation (Medical): No    Lack of Transportation (Non-Medical): No       REVIEW OF SYSTEMS    No Known Allergies    Current Outpatient Medications on File Prior to Encounter   Medication Sig Dispense Refill    Flaxseed, Linseed, (FLAX SEED OIL) 1000 MG CAPS Take 1,000 mg by mouth daily      aspirin 81 MG EC tablet Take 81 mg by mouth daily as needed for Pain      amLODIPine (NORVASC) 2.5 MG tablet TAKE ONE TABLET BY MOUTH DAILY 90 tablet 1    Multiple Vitamins-Minerals (THERAPEUTIC MULTIVITAMIN-MINERALS) tablet Take 1 tablet by mouth daily       No current facility-administered medications on file prior to encounter. Review of Systems   Constitutional:  Negative for chills and fever.    HENT:  Negative for congestion, ear pain, rhinorrhea, sore throat and trouble swallowing. Respiratory:  Negative for cough, shortness of breath and wheezing. Apnea: + snoring- advised f/u with PCP- states she already has. Cardiovascular:  Negative for chest pain, palpitations and leg swelling. Gastrointestinal:  Negative for abdominal pain, anal bleeding, blood in stool, constipation, diarrhea, nausea and vomiting. Genitourinary:  Negative for dysuria and frequency. Skin:  Negative for rash and wound. Neurological:  Negative for dizziness and headaches (Hx of). Hematological:  Does not bruise/bleed easily. GENERAL PHYSICAL EXAM     Vitals: BP (!) 144/93   Pulse 86   Temp 97 °F (36.1 °C) (Infrared)   Resp 16   Ht 5' 2\" (1.575 m)   Wt 165 lb (74.8 kg)   LMP 01/12/2023 (Exact Date)   SpO2 100%   BMI 30.18 kg/m²               Physical Exam  Vitals reviewed. Constitutional:       General: She is not in acute distress. Appearance: She is well-developed. She is not ill-appearing, toxic-appearing or diaphoretic. HENT:      Head: Normocephalic. Right Ear: External ear normal.      Left Ear: External ear normal.      Nose: Nose normal.      Mouth/Throat:      Pharynx: No oropharyngeal exudate or posterior oropharyngeal erythema. Tonsils: No tonsillar abscesses. Eyes:      General:         Right eye: No discharge. Left eye: No discharge. Conjunctiva/sclera: Conjunctivae normal.      Pupils: Pupils are equal, round, and reactive to light. Neck:      Thyroid: Thyromegaly (Possible very mild thyromegaly noted- TSH w/reflex T4 added on to PAT lab work- advised f/u with PCP) present. Cardiovascular:      Rate and Rhythm: Normal rate and regular rhythm. Pulses: Intact distal pulses. Heart sounds: Murmur heard. Systolic murmur is present with a grade of 1/6. Pulmonary:      Effort: Pulmonary effort is normal. No accessory muscle usage or respiratory distress.       Breath sounds: Normal breath sounds. No decreased breath sounds, wheezing, rhonchi or rales. Chest:      Comments: Breast exam deferred to surgeon. Abdominal:      General: Bowel sounds are normal. There is no distension. Palpations: Abdomen is soft. There is no mass. Tenderness: There is no abdominal tenderness. There is no guarding or rebound. Musculoskeletal:      Right lower leg: No swelling or tenderness. No edema. Left lower leg: No swelling or tenderness. No edema. Comments: Negative Nima's sign b/l (performed in sitting position). Lymphadenopathy:      Cervical: No cervical adenopathy. Skin:     General: Skin is warm and dry. Neurological:      Mental Status: She is alert and oriented to person, place, and time. Psychiatric:         Behavior: Behavior normal.       LAB REVIEW     Component Ref Range & Units 1/12/23 1100 11/9/22 0750 8/16/19 1117   Glucose 70 - 99 mg/dL 91  79  91    BUN 6 - 20 mg/dL 9  12  10    Creatinine 0.50 - 0.90 mg/dL 0.74  0.78  0.72    Est, Glom Filt Rate >60 mL/min/1.73m2 >60  >60 CM     Comment:        Effective Oct 3, 2022         These results are not intended for use in patients <25years of age. eGFR results are calculated without a race factor using the 2021 CKD-EPI equation. Careful clinical correlation is recommended, particularly when comparing to results   calculated using previous equations. The CKD-EPI equation is less accurate in patients with extremes of muscle mass, extra-renal   metabolism of creatine, excessive creatine ingestion, or following therapy that affects   renal tubular secretion.     Calcium 8.6 - 10.4 mg/dL 9.0  9.0  9.6    Sodium 135 - 144 mmol/L 139  142  138    Potassium 3.7 - 5.3 mmol/L 4.1  4.5  4.3    Chloride 98 - 107 mmol/L 102  106  102    CO2 20 - 31 mmol/L 27  24  26    Anion Gap 9 - 17 mmol/L 10  12  10    Alkaline Phosphatase   80 R  75 R    GFR     >60 R    GFR Comment         CM    GFR Staging    NOT REPORTED    ALT   13 R  33 R    AST   21 R  23 R    Total Bilirubin   0.3 R  0.30 R    Total Protein   6.9 R  7.2 R    Albumin   4.4 R  4.3 R    Albumin/Globulin Ratio   1.8 R  1.5 R    Bun/Cre Ratio    NOT REPORTED R    GFR Non-    >60 R    Resulting Agency  [address]     Component Ref Range & Units 1/12/23 1100 11/9/22 0750 1/22/20 1137   WBC 3.5 - 11.0 k/uL 2.9 Low   2.7 Low  R  4.0 R    RBC 4.0 - 5.2 m/uL 5.85 High   6.16 High  R  5.74 High  R    Hemoglobin 12.0 - 16.0 g/dL 12.3  13.0 R  12.7 R    Hematocrit 36 - 46 % 38.6  43.8 R  41.6 R    MCV 80 - 100 fL 66.0 Low   71.1 Low  R  72.5 Low  R    MCH 26 - 34 pg 21.1 Low   21.1 Low  R  22.1 Low  R    MCHC 31 - 37 g/dL 31.9  29.7 R  30.5 R    RDW 11.5 - 14.9 % 15.3 High   16.2 High  R  17.3 High  R    Platelets 724 - 485 k/uL 333  355 R  See Reflexed IPF Result R    MPV 6.0 - 12.0 fL 9.5  11.9 R  NOT REPORTED R    Seg Neutrophils 36 - 66 % 58   61 R    Lymphocytes 24 - 44 % 31   28 R    Monocytes 1 - 7 % 6   7 R    Eosinophils % 0 - 4 % 5 High    3 R    Basophils 0 - 2 % 0   1    Segs Absolute 1.3 - 9.1 k/uL 1.68   2.41 R    Absolute Lymph # 1.0 - 4.8 k/uL 0.90 Low    1.11 R    Absolute Mono # 0.1 - 1.3 k/uL 0.17   0.29 R    Absolute Eos # 0.0 - 0.4 k/uL 0.15   0.10 R    Basophils Absolute 0.0 - 0.2 k/uL 0.00   0.03 R    Morphology  ANISOCYTOSIS PRESENT      Morphology  MICROCYTOSIS PRESENT      Morphology  HYPOCHROMIA PRESENT      NRBC Automated   0.0 R  0.0 R    Differential Type    NOT REPORTED    WBC Morphology    NOT REPORTED    RBC Morphology    ANISOCYTOSIS PRESENT CM    Platelet Estimate    NOT REPORTED    Immature Granulocytes    0 R    Absolute Immature Granulocyte    <0.03 R    Resulting Agency  [address]     Component Ref Range & Units 1/12/23 1100 1/22/20 1137   TSH 0.30 - 5. 00 uIU/mL 0.79  0.69 [facility]  [address] [place] [address] 153     PRELIMINARY EKG REVIEW, DATE: 1-12-23     NSR  POSSIBLE LEFT ATRIAL ENLARGEMENT  BORDERLINE ECG  68 BPM    Note: Advised patient to f/u with PCP regarding today's \"borderline ECG\". 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 See Care Everywhere for additional detail. Preliminary EKG, CBC, BMP completed today has been previewed per myself. CLEARANCE: Medical clearance w/patient's PCP, Dr. Bria Alas is scheduled for 1-17-23. Based on my personal evaluation of patient including review of patient's chart, medical clearance required for scheduled surgery d/t the following issues which are discussed in H&P above:  - HTN  - BORDERLINE ECG FINDINGS, 1-12-23 \"NSR, POSSIBLE LEFT ATRIAL ENLARGEMENT, BORDERLINE ECG, 68 BPM\"  - HX OF PNA  - HX OF SNORING  - SLIGHT HEART MURMUR, POSSIBLE MILD THYROMEGALY NOTED ON EXAM 1-12-23  - OBESITY  - WBC- 2.9 (1-12-23)  Dr. Cadena  office who will be responsible for making sure the clearance is obtained and is in the chart for surgery.     Patient does have chart within [place] [id number] for further detail (denies known hx of Lyme disease, hives as noted per [facility] chart).     Total time spent on encounter- PAT provider minutes: 31-40 minutes     TEZ Kumar - CNP on 1/12/2023 at 4:53 PM

## 2023-01-12 NOTE — PROGRESS NOTES
Dr Sharon Martinez requested medical clearance and patient has an appt with PCP on 1/17 for clearance.

## 2023-01-13 LAB
EKG ATRIAL RATE: 68 BPM
EKG P AXIS: 54 DEGREES
EKG P-R INTERVAL: 178 MS
EKG Q-T INTERVAL: 388 MS
EKG QRS DURATION: 74 MS
EKG QTC CALCULATION (BAZETT): 412 MS
EKG R AXIS: 77 DEGREES
EKG T AXIS: 39 DEGREES
EKG VENTRICULAR RATE: 68 BPM

## 2023-01-13 PROCEDURE — 93010 ELECTROCARDIOGRAM REPORT: CPT | Performed by: INTERNAL MEDICINE

## 2023-01-18 ENCOUNTER — ANESTHESIA EVENT (OUTPATIENT)
Dept: OPERATING ROOM | Age: 53
End: 2023-01-18
Payer: COMMERCIAL

## 2023-01-19 ENCOUNTER — APPOINTMENT (OUTPATIENT)
Dept: WOMENS IMAGING | Age: 53
End: 2023-01-19
Attending: STUDENT IN AN ORGANIZED HEALTH CARE EDUCATION/TRAINING PROGRAM
Payer: COMMERCIAL

## 2023-01-19 ENCOUNTER — ANESTHESIA (OUTPATIENT)
Dept: OPERATING ROOM | Age: 53
End: 2023-01-19
Payer: COMMERCIAL

## 2023-01-19 ENCOUNTER — HOSPITAL ENCOUNTER (OUTPATIENT)
Age: 53
Setting detail: OUTPATIENT SURGERY
Discharge: HOME | End: 2023-01-19
Attending: STUDENT IN AN ORGANIZED HEALTH CARE EDUCATION/TRAINING PROGRAM | Admitting: STUDENT IN AN ORGANIZED HEALTH CARE EDUCATION/TRAINING PROGRAM
Payer: COMMERCIAL

## 2023-01-19 VITALS
DIASTOLIC BLOOD PRESSURE: 77 MMHG | BODY MASS INDEX: 30.36 KG/M2 | OXYGEN SATURATION: 97 % | SYSTOLIC BLOOD PRESSURE: 133 MMHG | HEIGHT: 62 IN | TEMPERATURE: 96.8 F | WEIGHT: 165 LBS | HEART RATE: 68 BPM | RESPIRATION RATE: 14 BRPM

## 2023-01-19 DIAGNOSIS — N63.20 MASS OF LEFT BREAST, UNSPECIFIED QUADRANT: ICD-10-CM

## 2023-01-19 DIAGNOSIS — R92.8 ABNORMAL MAMMOGRAM: ICD-10-CM

## 2023-01-19 DIAGNOSIS — G89.18 ACUTE POST-OPERATIVE PAIN: Primary | ICD-10-CM

## 2023-01-19 LAB — HCG, PREGNANCY URINE (POC): NEGATIVE

## 2023-01-19 PROCEDURE — 2720000010 HC SURG SUPPLY STERILE: Performed by: STUDENT IN AN ORGANIZED HEALTH CARE EDUCATION/TRAINING PROGRAM

## 2023-01-19 PROCEDURE — 6360000002 HC RX W HCPCS

## 2023-01-19 PROCEDURE — 3600000012 HC SURGERY LEVEL 2 ADDTL 15MIN: Performed by: STUDENT IN AN ORGANIZED HEALTH CARE EDUCATION/TRAINING PROGRAM

## 2023-01-19 PROCEDURE — 2500000003 HC RX 250 WO HCPCS

## 2023-01-19 PROCEDURE — 7100000031 HC ASPR PHASE II RECOVERY - ADDTL 15 MIN: Performed by: STUDENT IN AN ORGANIZED HEALTH CARE EDUCATION/TRAINING PROGRAM

## 2023-01-19 PROCEDURE — 76098 X-RAY EXAM SURGICAL SPECIMEN: CPT

## 2023-01-19 PROCEDURE — 2580000003 HC RX 258: Performed by: ANESTHESIOLOGY

## 2023-01-19 PROCEDURE — 3600000002 HC SURGERY LEVEL 2 BASE: Performed by: STUDENT IN AN ORGANIZED HEALTH CARE EDUCATION/TRAINING PROGRAM

## 2023-01-19 PROCEDURE — 3700000000 HC ANESTHESIA ATTENDED CARE: Performed by: STUDENT IN AN ORGANIZED HEALTH CARE EDUCATION/TRAINING PROGRAM

## 2023-01-19 PROCEDURE — 6360000002 HC RX W HCPCS: Performed by: STUDENT IN AN ORGANIZED HEALTH CARE EDUCATION/TRAINING PROGRAM

## 2023-01-19 PROCEDURE — 7100000000 HC PACU RECOVERY - FIRST 15 MIN: Performed by: STUDENT IN AN ORGANIZED HEALTH CARE EDUCATION/TRAINING PROGRAM

## 2023-01-19 PROCEDURE — 7100000030 HC ASPR PHASE II RECOVERY - FIRST 15 MIN: Performed by: STUDENT IN AN ORGANIZED HEALTH CARE EDUCATION/TRAINING PROGRAM

## 2023-01-19 PROCEDURE — 6370000000 HC RX 637 (ALT 250 FOR IP): Performed by: ANESTHESIOLOGY

## 2023-01-19 PROCEDURE — 7100000010 HC PHASE II RECOVERY - FIRST 15 MIN: Performed by: STUDENT IN AN ORGANIZED HEALTH CARE EDUCATION/TRAINING PROGRAM

## 2023-01-19 PROCEDURE — 7100000001 HC PACU RECOVERY - ADDTL 15 MIN: Performed by: STUDENT IN AN ORGANIZED HEALTH CARE EDUCATION/TRAINING PROGRAM

## 2023-01-19 PROCEDURE — 2500000003 HC RX 250 WO HCPCS: Performed by: ANESTHESIOLOGY

## 2023-01-19 PROCEDURE — 2500000003 HC RX 250 WO HCPCS: Performed by: STUDENT IN AN ORGANIZED HEALTH CARE EDUCATION/TRAINING PROGRAM

## 2023-01-19 PROCEDURE — 7100000011 HC PHASE II RECOVERY - ADDTL 15 MIN: Performed by: STUDENT IN AN ORGANIZED HEALTH CARE EDUCATION/TRAINING PROGRAM

## 2023-01-19 PROCEDURE — 88305 TISSUE EXAM BY PATHOLOGIST: CPT

## 2023-01-19 PROCEDURE — 2709999900 HC NON-CHARGEABLE SUPPLY: Performed by: STUDENT IN AN ORGANIZED HEALTH CARE EDUCATION/TRAINING PROGRAM

## 2023-01-19 PROCEDURE — 81025 URINE PREGNANCY TEST: CPT

## 2023-01-19 PROCEDURE — 3700000001 HC ADD 15 MINUTES (ANESTHESIA): Performed by: STUDENT IN AN ORGANIZED HEALTH CARE EDUCATION/TRAINING PROGRAM

## 2023-01-19 RX ORDER — SODIUM CHLORIDE 0.9 % (FLUSH) 0.9 %
5-40 SYRINGE (ML) INJECTION PRN
Status: DISCONTINUED | OUTPATIENT
Start: 2023-01-19 | End: 2023-01-19 | Stop reason: HOSPADM

## 2023-01-19 RX ORDER — SODIUM CHLORIDE 0.9 % (FLUSH) 0.9 %
5-40 SYRINGE (ML) INJECTION EVERY 12 HOURS SCHEDULED
Status: DISCONTINUED | OUTPATIENT
Start: 2023-01-19 | End: 2023-01-19 | Stop reason: HOSPADM

## 2023-01-19 RX ORDER — HYDRALAZINE HYDROCHLORIDE 20 MG/ML
10 INJECTION INTRAMUSCULAR; INTRAVENOUS
Status: DISCONTINUED | OUTPATIENT
Start: 2023-01-19 | End: 2023-01-19 | Stop reason: HOSPADM

## 2023-01-19 RX ORDER — HYDROCODONE BITARTRATE AND ACETAMINOPHEN 5; 325 MG/1; MG/1
1 TABLET ORAL EVERY 6 HOURS PRN
Status: CANCELLED | OUTPATIENT
Start: 2023-01-19

## 2023-01-19 RX ORDER — GABAPENTIN 300 MG/1
300 CAPSULE ORAL ONCE
Status: COMPLETED | OUTPATIENT
Start: 2023-01-19 | End: 2023-01-19

## 2023-01-19 RX ORDER — DEXAMETHASONE SODIUM PHOSPHATE 4 MG/ML
INJECTION, SOLUTION INTRA-ARTICULAR; INTRALESIONAL; INTRAMUSCULAR; INTRAVENOUS; SOFT TISSUE PRN
Status: DISCONTINUED | OUTPATIENT
Start: 2023-01-19 | End: 2023-01-19 | Stop reason: SDUPTHER

## 2023-01-19 RX ORDER — SODIUM CHLORIDE 0.9 % (FLUSH) 0.9 %
5-40 SYRINGE (ML) INJECTION EVERY 12 HOURS SCHEDULED
Status: DISCONTINUED | OUTPATIENT
Start: 2023-01-19 | End: 2023-01-19 | Stop reason: SDUPTHER

## 2023-01-19 RX ORDER — SODIUM CHLORIDE 9 MG/ML
INJECTION, SOLUTION INTRAVENOUS PRN
Status: DISCONTINUED | OUTPATIENT
Start: 2023-01-19 | End: 2023-01-19 | Stop reason: SDUPTHER

## 2023-01-19 RX ORDER — PROPOFOL 10 MG/ML
INJECTION, EMULSION INTRAVENOUS PRN
Status: DISCONTINUED | OUTPATIENT
Start: 2023-01-19 | End: 2023-01-19 | Stop reason: SDUPTHER

## 2023-01-19 RX ORDER — ONDANSETRON 2 MG/ML
INJECTION INTRAMUSCULAR; INTRAVENOUS PRN
Status: DISCONTINUED | OUTPATIENT
Start: 2023-01-19 | End: 2023-01-19 | Stop reason: SDUPTHER

## 2023-01-19 RX ORDER — FENTANYL CITRATE 50 UG/ML
INJECTION, SOLUTION INTRAMUSCULAR; INTRAVENOUS PRN
Status: DISCONTINUED | OUTPATIENT
Start: 2023-01-19 | End: 2023-01-19 | Stop reason: SDUPTHER

## 2023-01-19 RX ORDER — METOCLOPRAMIDE HYDROCHLORIDE 5 MG/ML
10 INJECTION INTRAMUSCULAR; INTRAVENOUS
Status: DISCONTINUED | OUTPATIENT
Start: 2023-01-19 | End: 2023-01-19 | Stop reason: HOSPADM

## 2023-01-19 RX ORDER — OXYCODONE HYDROCHLORIDE 5 MG/1
5 TABLET ORAL EVERY 6 HOURS PRN
Qty: 12 TABLET | Refills: 0 | Status: SHIPPED | OUTPATIENT
Start: 2023-01-19 | End: 2023-01-22

## 2023-01-19 RX ORDER — BUPIVACAINE HYDROCHLORIDE 2.5 MG/ML
INJECTION, SOLUTION EPIDURAL; INFILTRATION; INTRACAUDAL PRN
Status: DISCONTINUED | OUTPATIENT
Start: 2023-01-19 | End: 2023-01-19 | Stop reason: ALTCHOICE

## 2023-01-19 RX ORDER — LABETALOL HYDROCHLORIDE 5 MG/ML
10 INJECTION, SOLUTION INTRAVENOUS
Status: DISCONTINUED | OUTPATIENT
Start: 2023-01-19 | End: 2023-01-19 | Stop reason: HOSPADM

## 2023-01-19 RX ORDER — ONDANSETRON 2 MG/ML
4 INJECTION INTRAMUSCULAR; INTRAVENOUS
Status: DISCONTINUED | OUTPATIENT
Start: 2023-01-19 | End: 2023-01-19 | Stop reason: HOSPADM

## 2023-01-19 RX ORDER — ONDANSETRON 4 MG/1
4 TABLET, FILM COATED ORAL 3 TIMES DAILY PRN
Qty: 15 TABLET | Refills: 0 | Status: SHIPPED | OUTPATIENT
Start: 2023-01-19

## 2023-01-19 RX ORDER — LIDOCAINE HYDROCHLORIDE 10 MG/ML
INJECTION, SOLUTION EPIDURAL; INFILTRATION; INTRACAUDAL; PERINEURAL PRN
Status: DISCONTINUED | OUTPATIENT
Start: 2023-01-19 | End: 2023-01-19 | Stop reason: SDUPTHER

## 2023-01-19 RX ORDER — LIDOCAINE HYDROCHLORIDE 10 MG/ML
1 INJECTION, SOLUTION EPIDURAL; INFILTRATION; INTRACAUDAL; PERINEURAL
Status: COMPLETED | OUTPATIENT
Start: 2023-01-19 | End: 2023-01-19

## 2023-01-19 RX ORDER — SODIUM CHLORIDE 9 MG/ML
25 INJECTION, SOLUTION INTRAVENOUS PRN
Status: DISCONTINUED | OUTPATIENT
Start: 2023-01-19 | End: 2023-01-19 | Stop reason: HOSPADM

## 2023-01-19 RX ORDER — SODIUM CHLORIDE 9 MG/ML
INJECTION, SOLUTION INTRAVENOUS PRN
Status: DISCONTINUED | OUTPATIENT
Start: 2023-01-19 | End: 2023-01-19 | Stop reason: HOSPADM

## 2023-01-19 RX ORDER — DOCUSATE SODIUM 100 MG/1
100 CAPSULE, LIQUID FILLED ORAL 2 TIMES DAILY PRN
Qty: 20 CAPSULE | Refills: 0 | Status: SHIPPED | OUTPATIENT
Start: 2023-01-19

## 2023-01-19 RX ORDER — DIPHENHYDRAMINE HYDROCHLORIDE 50 MG/ML
12.5 INJECTION INTRAMUSCULAR; INTRAVENOUS
Status: DISCONTINUED | OUTPATIENT
Start: 2023-01-19 | End: 2023-01-19 | Stop reason: HOSPADM

## 2023-01-19 RX ORDER — MIDAZOLAM HYDROCHLORIDE 1 MG/ML
INJECTION INTRAMUSCULAR; INTRAVENOUS PRN
Status: DISCONTINUED | OUTPATIENT
Start: 2023-01-19 | End: 2023-01-19 | Stop reason: SDUPTHER

## 2023-01-19 RX ORDER — SODIUM CHLORIDE, SODIUM LACTATE, POTASSIUM CHLORIDE, CALCIUM CHLORIDE 600; 310; 30; 20 MG/100ML; MG/100ML; MG/100ML; MG/100ML
INJECTION, SOLUTION INTRAVENOUS CONTINUOUS
Status: DISCONTINUED | OUTPATIENT
Start: 2023-01-19 | End: 2023-01-19 | Stop reason: HOSPADM

## 2023-01-19 RX ORDER — ACETAMINOPHEN 500 MG
1000 TABLET ORAL ONCE
Status: COMPLETED | OUTPATIENT
Start: 2023-01-19 | End: 2023-01-19

## 2023-01-19 RX ORDER — SODIUM CHLORIDE 0.9 % (FLUSH) 0.9 %
5-40 SYRINGE (ML) INJECTION PRN
Status: DISCONTINUED | OUTPATIENT
Start: 2023-01-19 | End: 2023-01-19 | Stop reason: SDUPTHER

## 2023-01-19 RX ORDER — FENTANYL CITRATE 0.05 MG/ML
25 INJECTION, SOLUTION INTRAMUSCULAR; INTRAVENOUS EVERY 5 MIN PRN
Status: DISCONTINUED | OUTPATIENT
Start: 2023-01-19 | End: 2023-01-19 | Stop reason: HOSPADM

## 2023-01-19 RX ORDER — SODIUM CHLORIDE, SODIUM LACTATE, POTASSIUM CHLORIDE, CALCIUM CHLORIDE 600; 310; 30; 20 MG/100ML; MG/100ML; MG/100ML; MG/100ML
INJECTION, SOLUTION INTRAVENOUS CONTINUOUS
Status: DISCONTINUED | OUTPATIENT
Start: 2023-01-19 | End: 2023-01-19 | Stop reason: SDUPTHER

## 2023-01-19 RX ADMIN — LIDOCAINE HYDROCHLORIDE 1 ML: 10 INJECTION, SOLUTION EPIDURAL; INFILTRATION; INTRACAUDAL; PERINEURAL at 06:49

## 2023-01-19 RX ADMIN — PROPOFOL 180 MG: 10 INJECTION, EMULSION INTRAVENOUS at 08:10

## 2023-01-19 RX ADMIN — SODIUM CHLORIDE, POTASSIUM CHLORIDE, SODIUM LACTATE AND CALCIUM CHLORIDE: 600; 310; 30; 20 INJECTION, SOLUTION INTRAVENOUS at 06:49

## 2023-01-19 RX ADMIN — ONDANSETRON 4 MG: 2 INJECTION INTRAMUSCULAR; INTRAVENOUS at 08:16

## 2023-01-19 RX ADMIN — LIDOCAINE HYDROCHLORIDE 40 MG: 10 INJECTION, SOLUTION EPIDURAL; INFILTRATION; INTRACAUDAL; PERINEURAL at 08:10

## 2023-01-19 RX ADMIN — ACETAMINOPHEN 1000 MG: 500 TABLET ORAL at 06:50

## 2023-01-19 RX ADMIN — CEFAZOLIN 2000 MG: 10 INJECTION, POWDER, FOR SOLUTION INTRAVENOUS at 08:14

## 2023-01-19 RX ADMIN — DEXAMETHASONE SODIUM PHOSPHATE 8 MG: 4 INJECTION, SOLUTION INTRA-ARTICULAR; INTRALESIONAL; INTRAMUSCULAR; INTRAVENOUS; SOFT TISSUE at 08:16

## 2023-01-19 RX ADMIN — MIDAZOLAM 2 MG: 1 INJECTION INTRAMUSCULAR; INTRAVENOUS at 08:03

## 2023-01-19 RX ADMIN — FENTANYL CITRATE 50 MCG: 50 INJECTION, SOLUTION INTRAMUSCULAR; INTRAVENOUS at 08:10

## 2023-01-19 RX ADMIN — GABAPENTIN 300 MG: 300 CAPSULE ORAL at 06:50

## 2023-01-19 ASSESSMENT — PAIN SCALES - GENERAL: PAINLEVEL_OUTOF10: 0

## 2023-01-19 ASSESSMENT — ENCOUNTER SYMPTOMS: SHORTNESS OF BREATH: 0

## 2023-01-19 ASSESSMENT — PAIN - FUNCTIONAL ASSESSMENT: PAIN_FUNCTIONAL_ASSESSMENT: 0-10

## 2023-01-19 NOTE — OP NOTE
Operative Note      Patient: Frank Aranda  YOB: 1970  MRN: [id number]    Date of Procedure: 1/19/2023    Pre-Op Diagnosis: Left breast retroareollar mass -- Benign spindle cell proliferation with collagenous stroma and benign breast ducts/lobules    Post-Op Diagnosis: Same       Procedure:  Excisional biopsy of left breast mass    Surgeon(s):  Annie Espana DO    Assistant: None    Anesthesia: General    Estimated Blood Loss (mL): 5    Complications: None    Specimens:   ID Type Source Tests Collected by Time Destination   A : Left breast biopsy SHORT STITCH SUPERIOR, LONG STITCH LATERAL, BLUE STITCH ANTERIOR  Tissue Breast SURGICAL PATHOLOGY Yossi Anderson DO J[place] 1/19/2023 0903        Implants: none      Drains: None    Findings: breast mass at the 3-4 o'clock position of left breast, adjacent to the areola. Indications: [place] is a 80-year-old female who was found to have a breast lesion of the left breast at the 3 or 4 o'clock position on screening mammogram.  Biopsy was performed revealing a benign spindle cell proliferation with collagen us stroma and benign breast ducts and lobules, however there is along differential potential list of diagnoses. The decision was made after discussion in the office to proceed to the operating room for an excisional biopsy of the mass for further clarification and classification of the breast lesion. Detailed Description of Procedure: [place] was brought to the operating room placed on the OR table in supine position. General anesthesia was induced by the anesthesia team.  A formal timeout identifying the patient, procedure, allergies, and antibiotics was performed. The patient was given 2 g Ancef prior to incision. The left breast was prepped with ChloraPrep and draped in the usual sterile fashion. A curvilinear incision was made on the lateral aspect of the left areola. The incision was carried down through the subcutaneous tissue with electrocautery. Once her breast tissue, a localizer was used to find the RF tag that was placed 1 week prior to procedure. Once the RF localizer's position was noted, 3-0 silk suture was placed within the mass encompassing the localizer in a figure-of-eight fashion without suturing it down. This suture was used to help isolate the mass for dissection. Circumferential dissection was performed around the palpable mass, and the localizer was used to confirm positioning throughout the dissection. Once the mass was circumferentially dissected, silk suture was used to deepa the margins, a short stitch was superior and a long stitch was lateral.  The mass was then transected with electrocautery. An additional PDS suture was placed in the anterior margin. The mass was placed on a grid and sent to radiology for examination. Hemostasis was confirmed within the breast cavity. A 3-0 Vicryl suture was used to approximate the deep layer of breast tissue in a cosmetically pleasing fashion. 1% lidocaine with epinephrine was used to anesthetize the skin incision. The dermal layer of the skin incision was approximated with 3-0 Vicryl in simple interrupted deep dermal fashion. A running subcuticular 4-0 Monocryl suture was performed to close the skin. Dermabond and Steri-Strips were placed across the skin incision. Fluffs and a bra were placed for surgical dressing. Radiology called as I was closing. They confirmed that the localizer, and 2 previous biopsy clips were within the specimen. This concluded the procedure. The patient tolerated well without immediate complications. All sponge, needles, instrument counts were correct at the end of the case. The patient was awoken in the operating room, extubated, transferred to PACU in stable condition.     Electronically signed by Erin Muñoz DO on 1/19/2023 at 9:33 AM

## 2023-01-19 NOTE — DISCHARGE INSTRUCTIONS
Post Operative Breast Surgery Discharge Instructions    WOUND CARE:   You have Skin glue and/or steri-strips overlying your incision(s):  Allow the glue and steri-strips to fall off naturally. If they begin to peel, it is ok to trim the the steri-strips. If the glue and steri-strips remain intact at 2 weeks after your surgery, it is ok to remove at that time. Do not apply lotion or ointment over the incisions    BATHING:    Ok to shower 24 hrs after your operation. Wash incisions gently with soap and allow water to wash down over the incisions. Do not submerge surgical incisions in water (baths, pools, hot tubs, lakes) for 2 weeks. DRIVING:   You may return to driving when: You are NOT taking narcotic pain medications AND  Your pain is minimal enough where you can safely slam on the breaks or turn suddenly if you needed to. ACTIVITY:  No surgical restrictions. If you perform an activity and it causes pain at your incision sites, recommend discontinuation of the activity. This is your body's way of saying it needs more time for healing. Exercise:  Listen to your body -- if you are experiencing pain - refrain from such activity, except for walking. You should be walking daily to prevent blood clots in your veins. You may return to all activities without restrictions:    2 weeks after surgery AND  When you are not experiencing pain at your incisions with movement    LIFTING:   No lifting restrictions    DIET:   No dietary restrictions  After surgery -- start slow with non greasy and fatty foods. Some experience nausea and vomiting after anesthesia - which may last 24 hours. MEDICATIONS:     Take medications as prescribed. Constipation Medications  It is important for you to take stool softeners (Doculax, Senna, Senakot, etc) when you are taking narcotic pain medications.  Once you are not taking narcotics, you can discontinue stool softener medications  If you do not have a bowel movement withint 2-3 days of surgery, begin taking 17 g or 1 cap full of Miralax (over the counter) daily until you achieve a bowel movement. Narcotic medications cause constipation. If you become constipated, it may be painful at your incision sites to have bowel movements. Pain Medications:  Schedule Tylenol 1,000 mg every 8 hours (3 times daily)  Schedule Motrin 400-600 mg every 6 hours (4 times daily)  Do not take this medication if you have kidney problems  Narcotics will be prescribed for you for 3-5 days post operatively - unless discussed pre-operatively. Take the narcotic medications as prescribed and wean as quickly as possible. Most patients require less than prescribed  You underwent surgery and will experience pain at the incision sites. This is normal. The pain will improve over the first 7-10 days with the expectation it may take 4-6 weeks before feeling back to your normal self. Work to wean of narcotic medications as quickly as able. These medications have the risk of addiction  Anticoagulation (blood thinners)  Ok to continue or restart Aspirin   Restart other anticoagulation -- (Coumadin, Eliquis, Plavix, etc) in 48 hours after surgery. If you have questions on what medications are ok to take -- ask your doctor. FOLLOW UP:   Call and schedule post operative follow up with Dr. Josey Zarate in 1 week - call sooner if there are questions or concerns.     WHEN TO CALL:  If you experience a fever of 101 or greater  If you notice redness around your incision which is increasing in size  If you notice bloody, green, or brown drainage from your incision  If you are having episodes of vomiting and unable to stay hydrated

## 2023-01-19 NOTE — ANESTHESIA POSTPROCEDURE EVALUATION
Department of Anesthesiology  Postprocedure Note    Patient: Siva Peralta  MRN: [id number]  YOB: 1970  Date of evaluation: 1/19/2023      Procedure Summary     Date: 01/19/23 Room / Location: [address] [place] [address] Joey Cadena 10 / [facility]     Anesthesia Start: 6062 Anesthesia Stop: 5393    Procedure: BREAST LESION BIOPSY EXCISIONAL (Left: Breast) Diagnosis:       Mass of left breast, unspecified quadrant      (LEFT BREAST MASS)    Surgeons: Kimberly Garcia DO Responsible Provider: Ingrid Carroll MD    Anesthesia Type: general ASA Status: 2          Anesthesia Type: No value filed.     Jam Phase I: Jam Score: 10    Jam Phase II: Jam Score: 10      Anesthesia Post Evaluation    Comments: POST- ANESTHESIA EVALUATION       Pt Name: Siva Peralta  MRN: [id number]  YOB: 1970  Date of evaluation: 1/19/2023  Time:  11:12 AM      /77   Pulse 68   Temp 96.8 °F (36 °C) (Infrared)   Resp 14   Ht 5' 2\" (1.575 m)   Wt 165 lb (74.8 kg)   LMP 01/12/2023 (Exact Date)   SpO2 97%   BMI 30.18 kg/m²      Consciousness Level  Awake  Cardiopulmonary Status  Stable  Pain Adequately Treated YES  Nausea / Vomiting  NO  Adequate Hydration  YES  Anesthesia Related Complications NONE      Electronically signed by Ingrid Carroll MD on 1/19/2023 at 11:12 AM

## 2023-01-19 NOTE — ANESTHESIA PRE PROCEDURE
Department of Anesthesiology  Preprocedure Note       Name:  An Aceves   Age:  46 y.o.  :  1970                                          MRN:  [id number]         Date:  2023      Surgeon: Claire Alvarez):  Ana Espana DO    Procedure: Procedure(s):  BREAST LESION BIOPSY EXCISION NEEDLE LOCALIZATION  NEEDLE LOC DONE  ON 23    Medications prior to admission:   Prior to Admission medications    Medication Sig Start Date End Date Taking?  Authorizing Provider   Flaxseed, Linseed, (FLAX SEED OIL) 1000 MG CAPS Take 1,000 mg by mouth daily    Historical Provider, MD   aspirin 81 MG EC tablet Take 81 mg by mouth daily as needed for Pain    Historical Provider, MD   amLODIPine (NORVASC) 2.5 MG tablet TAKE ONE TABLET BY MOUTH DAILY 10/20/22   Myra Rayo MD   Multiple Vitamins-Minerals (THERAPEUTIC MULTIVITAMIN-MINERALS) tablet Take 1 tablet by mouth daily    Historical Provider, MD       Current medications:    Current Facility-Administered Medications   Medication Dose Route Frequency Provider Last Rate Last Admin    ceFAZolin (ANCEF) 2000 mg in dextrose 5 % 50 mL IVPB  2,000 mg IntraVENous On Call to OR Annie Espana DO        lactated ringers infusion   IntraVENous Continuous Leonard Streeter  mL/hr at 23 0649 New Bag at 23 0649    sodium chloride flush 0.9 % injection 5-40 mL  5-40 mL IntraVENous 2 times per day Leonard Streeter MD        sodium chloride flush 0.9 % injection 5-40 mL  5-40 mL IntraVENous PRN Leonard Streeter MD        0.9 % sodium chloride infusion   IntraVENous PRN Leonard Streeter MD           Allergies:  No Known Allergies    Problem List:    Patient Active Problem List   Diagnosis Code    Tension type headache G44.209    Alopecia L65.9    Family history of aneurysm of blood vessel of brain Z82.49    Elevated blood pressure [id number]    Closed nondisplaced fracture of proximal phalanx of lesser toe of left foot S92.515A    Pain in right foot M79.671  Pain in toe of left foot M79.675    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    Plantar fasciitis M72.2    Preop examination Z01.818       Past Medical History:        Diagnosis Date    Hypertension     Left breast mass     Pneumonia     Patient states hx of hospitalization    Snoring     UTI (urinary tract infection)        Past Surgical History:        Procedure Laterality Date    BREAST BIOPSY Left 2014     SECTION      COLONOSCOPY  2020    negative- Procedure: COLONOSCOPY; Surgeon: Corky Danielson MD; Location: [facility] ENDOSCOPY; Service: Gastroenterology;  Laterality: N/A;    OTHER SURGICAL HISTORY Left 2023    RF transmitter localization performed for lumpectomy    SKIN GRAFT Right     Right forearm r/t washing machine accident   [address] LOC DEVICE 1ST LESION LEFT Left 2022    US BREAST NEEDLE BIOPSY LEFT 2022 [address]    US GUIDED NEEDLE LOC OF LEFT BREAST Left 2023    US GUIDED NEEDLE LOC OF LEFT BREAST 2023 [address]    WISDOM TOOTH EXTRACTION         Social History:    Social History     Tobacco Use    Smoking status: Never    Smokeless tobacco: Never   Substance Use Topics    Alcohol use: Never                                Counseling given: Not Answered      Vital Signs (Current):   Vitals:    23 0629 23 0635   BP:  139/85   Pulse:  75   Resp:  16   Temp:  97.3 °F (36.3 °C)   TempSrc:  Infrared   SpO2:  96%   Weight: 165 lb (74.8 kg)    Height: 5' 2\" (1.575 m)                                               BP Readings from Last 3 Encounters:   23 139/85   23 138/84   23 (!) 144/93       NPO Status: Time of last liquid consumption:                         Time of last solid consumption:                         Date of last liquid consumption: 23                        Date of last solid food consumption: 23    BMI:   Wt Readings from Last 3 Encounters: 01/19/23 165 lb (74.8 kg)   01/17/23 160 lb 12.8 oz (72.9 kg)   01/12/23 165 lb (74.8 kg)     Body mass index is 30.18 kg/m². CBC:   Lab Results   Component Value Date/Time    WBC 2.9 01/12/2023 11:00 AM    RBC 5.85 01/12/2023 11:00 AM    HGB 12.3 01/12/2023 11:00 AM    HCT 38.6 01/12/2023 11:00 AM    MCV 66.0 01/12/2023 11:00 AM    RDW 15.3 01/12/2023 11:00 AM     01/12/2023 11:00 AM       CMP:   Lab Results   Component Value Date/Time     01/12/2023 11:00 AM    K 4.1 01/12/2023 11:00 AM     01/12/2023 11:00 AM    CO2 27 01/12/2023 11:00 AM    BUN 9 01/12/2023 11:00 AM    CREATININE 0.74 01/12/2023 11:00 AM    GFRAA >60 08/16/2019 11:17 AM    LABGLOM >60 01/12/2023 11:00 AM    GLUCOSE 91 01/12/2023 11:00 AM    PROT 6.9 11/09/2022 07:50 AM    CALCIUM 9.0 01/12/2023 11:00 AM    BILITOT 0.3 11/09/2022 07:50 AM    ALKPHOS 80 11/09/2022 07:50 AM    AST 21 11/09/2022 07:50 AM    ALT 13 11/09/2022 07:50 AM       POC Tests: No results for input(s): POCGLU, POCNA, POCK, POCCL, POCBUN, POCHEMO, POCHCT in the last 72 hours.     Coags: No results found for: PROTIME, INR, APTT    HCG (If Applicable):   Lab Results   Component Value Date    HCG NEGATIVE 01/19/2023        ABGs: No results found for: PHART, PO2ART, MQS5SHR, JUX5BBS, BEART, I3UDLROY     Type & Screen (If Applicable):  No results found for: LABABO, LABRH    Drug/Infectious Status (If Applicable):  No results found for: HIV, HEPCAB    COVID-19 Screening (If Applicable): No results found for: COVID19        Anesthesia Evaluation  Patient summary reviewed and Nursing notes reviewed no history of anesthetic complications:   Airway: Mallampati: III  TM distance: >3 FB   Neck ROM: full  Mouth opening: > = 3 FB   Dental:          Pulmonary:Negative Pulmonary ROS and normal exam  breath sounds clear to auscultation      (-) shortness of breath                           Cardiovascular:  Exercise tolerance: good (>4 METS),   (+) hypertension:,     (-) angina    ECG reviewed  Rhythm: regular  Rate: normal                    Neuro/Psych:   (+) headaches:,             GI/Hepatic/Renal: Neg GI/Hepatic/Renal ROS       (-) GERD       Endo/Other:    (+) Cancer: left breast mass. .    (-) diabetes mellitus, hypothyroidism, hyperthyroidism, blood dyscrasiaCancer: left breast mass. ROS comment: Left breast mass Abdominal:             Vascular: Other Findings:           Anesthesia Plan      general     ASA 2       Induction: intravenous. MIPS: Postoperative opioids intended and Prophylactic antiemetics administered. Anesthetic plan and risks discussed with patient. Plan discussed with CRNA.                     Magnus Miranda MD   1/19/2023

## 2023-01-19 NOTE — INTERVAL H&P NOTE
Update History & Physical    The patient's History and Physical of January 12, 2023 was reviewed with the patient and I examined the patient. There was no change. Here today for BREAST LESION BIOPSY EXCISION NEEDLE LOCALIZATION NEEDLE LOC DONE ON 1/12/23 (LEFT) per Dr. Kit Bradshaw. Pt AAO x 3 in NAD. HRRR. LS CTA throughout all lung fields bilaterally. No respiratory distress. NPO p MN. Took amlodipine this am with sip of water. Stopped vitamins, supplements and aspirin one week ago. Denies taking anticoagulants or blood thinning medications. Denies recent or current chest pain/pressure, palpitations, SOB, recent URI, fever or chills. Review vitals per RN flowsheet.        Electronically signed by TEZ Salamanca CNP on 1/19/2023 at 6:19 AM

## 2023-01-23 LAB — SURGICAL PATHOLOGY REPORT: NORMAL

## 2023-01-24 ENCOUNTER — TELEPHONE (OUTPATIENT)
Dept: SURGERY | Age: 53
End: 2023-01-24

## 2023-01-24 NOTE — TELEPHONE ENCOUNTER
Pt informed of benign bx results and recommendation of yearly mammogram .   Pt states she is doing well and would like to cancel appt for 01/30 . Office cancelled appt and informed pt to call office with any questions or concerns .      Electronically signed by Ruslan Lopez MA on 1/24/2023 at 12:17 PM

## 2023-01-24 NOTE — TELEPHONE ENCOUNTER
Office lvm for pt to return office call to give results.    Electronically signed by Rick Bryant MA on 1/24/2023 at 12:14 PM

## 2023-02-11 PROBLEM — Z01.818 PREOP EXAMINATION: Status: RESOLVED | Noted: 2023-01-12 | Resolved: 2023-02-11

## 2024-06-04 ENCOUNTER — HOSPITAL ENCOUNTER (OUTPATIENT)
Age: 54
Setting detail: SPECIMEN
Discharge: HOME | End: 2024-06-04

## 2024-06-14 LAB — CYTOLOGY REPORT: NORMAL

## 2025-07-11 ENCOUNTER — HOSPITAL ENCOUNTER (OUTPATIENT)
Age: 55
Setting detail: SPECIMEN
Discharge: HOME | End: 2025-07-11

## 2025-07-11 DIAGNOSIS — Z11.59 NEED FOR HEPATITIS C SCREENING TEST: ICD-10-CM

## 2025-07-11 DIAGNOSIS — I10 ESSENTIAL HYPERTENSION: ICD-10-CM

## 2025-07-11 DIAGNOSIS — R73.03 PREDIABETES: ICD-10-CM

## 2025-07-11 LAB
ALBUMIN SERPL-MCNC: 4.3 G/DL (ref 3.5–5.2)
ALBUMIN/GLOB SERPL: 1.4 {RATIO} (ref 1–2.5)
ALP SERPL-CCNC: 89 U/L (ref 35–104)
ALT SERPL-CCNC: 38 U/L (ref 10–35)
ANION GAP SERPL CALCULATED.3IONS-SCNC: 12 MMOL/L (ref 9–16)
AST SERPL-CCNC: 25 U/L (ref 10–35)
BILIRUB SERPL-MCNC: 0.3 MG/DL (ref 0–1.2)
BUN SERPL-MCNC: 12 MG/DL (ref 6–20)
CALCIUM SERPL-MCNC: 9.7 MG/DL (ref 8.6–10.4)
CHLORIDE SERPL-SCNC: 102 MMOL/L (ref 98–107)
CHOLEST SERPL-MCNC: 185 MG/DL (ref 0–199)
CHOLESTEROL/HDL RATIO: 3.7
CO2 SERPL-SCNC: 25 MMOL/L (ref 20–31)
CREAT SERPL-MCNC: 0.8 MG/DL (ref 0.6–0.9)
ERYTHROCYTE [DISTWIDTH] IN BLOOD BY AUTOMATED COUNT: 17.2 % (ref 11.8–14.4)
GFR, ESTIMATED: 88 ML/MIN/1.73M2
GLUCOSE SERPL-MCNC: 90 MG/DL (ref 74–99)
HCT VFR BLD AUTO: 41.1 % (ref 36.3–47.1)
HCV AB SERPL QL IA: NONREACTIVE
HDLC SERPL-MCNC: 50 MG/DL
HGB BLD-MCNC: 12.5 G/DL (ref 11.9–15.1)
LDLC SERPL CALC-MCNC: 113 MG/DL (ref 0–100)
MCH RBC QN AUTO: 20.5 PG (ref 25.2–33.5)
MCHC RBC AUTO-ENTMCNC: 30.4 G/DL (ref 28.4–34.8)
MCV RBC AUTO: 67.4 FL (ref 82.6–102.9)
NRBC BLD-RTO: 0 PER 100 WBC
PLATELET # BLD AUTO: ABNORMAL K/UL (ref 138–453)
PLATELET, FLUORESCENCE: 329 K/UL (ref 138–453)
PLATELETS.RETICULATED NFR BLD AUTO: 4.5 % (ref 1.1–10.3)
POTASSIUM SERPL-SCNC: 4.3 MMOL/L (ref 3.7–5.3)
PROT SERPL-MCNC: 7.4 G/DL (ref 6.6–8.7)
RBC # BLD AUTO: 6.1 M/UL (ref 3.95–5.11)
SODIUM SERPL-SCNC: 139 MMOL/L (ref 136–145)
TRIGL SERPL-MCNC: 108 MG/DL (ref 0–149)
VLDLC SERPL CALC-MCNC: 22 MG/DL (ref 1–30)
WBC OTHER # BLD: 3.5 K/UL (ref 3.5–11.3)

## (undated) DEVICE — BLANKET WRM W40.2XL55.9IN IORT LO BODY + MISTRAL AIR

## (undated) DEVICE — ST CHARLES MINOR ABDOMINAL PK: Brand: MEDLINE INDUSTRIES, INC.

## (undated) DEVICE — ADHESIVE SKIN CLOSURE TOP 36 CC HI VISC DERMBND MINI

## (undated) DEVICE — BLADE ES ELASTOMERIC COAT INSUL DURABLE BEND UPTO 90DEG

## (undated) DEVICE — SUTURE PERMA-HAND SZ 2-0 L30IN NONABSORBABLE BLK L26MM SH K833H

## (undated) DEVICE — GAUZE,SPONGE,FLUFF,6"X6.75",STRL,5/TRAY: Brand: MEDLINE

## (undated) DEVICE — SOLUTION IRRIG 1000ML 0.9% SOD CHL USP POUR PLAS BTL

## (undated) DEVICE — SYRINGE IRRIG 60ML SFT PLIABLE BLB EZ TO GRP 1 HND USE W/

## (undated) DEVICE — SUTURE PDS + SZ 2 0 L27IN ABSRB VLT L36MM CT 1 1 2 CIR PDP339H

## (undated) DEVICE — MERCY HEALTH ST CHARLES: Brand: MEDLINE INDUSTRIES, INC.

## (undated) DEVICE — PROBE SET W/ DRP

## (undated) DEVICE — 3M™ IOBAN™ 2 ANTIMICROBIAL INCISE DRAPE 6650EZ: Brand: IOBAN™ 2

## (undated) DEVICE — DRESSING TRNSPAR W5XL4.5IN FLM SHT SEMIPERMEABLE WIND

## (undated) DEVICE — SHEET, ORTHO, SPLIT, STERILE: Brand: MEDLINE

## (undated) DEVICE — SUTURE MCRYL + SZ 4-0 L27IN ABSRB UD L19MM PS-2 3/8 CIR MCP426H

## (undated) DEVICE — SOLUTION IRRIG 1000ML STRL H2O USP PLAS POUR BTL

## (undated) DEVICE — SINGLE PORT MANIFOLD: Brand: NEPTUNE 2

## (undated) DEVICE — STRIP,CLOSURE,WOUND,MEDI-STRIP,1/2X4: Brand: MEDLINE

## (undated) DEVICE — COVER,MAYO STAND,STERILE: Brand: MEDLINE

## (undated) DEVICE — GOWN,SIRUS,NONRNF,SETINSLV,XL,20/CS: Brand: MEDLINE

## (undated) DEVICE — GLOVE SURG SZ 7 CRM LTX FREE POLYISOPRENE POLYMER BEAD ANTI

## (undated) DEVICE — SUTURE VCRL + SZ 3-0 L27IN ABSRB UD L26MM SH 1/2 CIR VCP416H

## (undated) DEVICE — SHEET,DRAPE,53X77,STERILE: Brand: MEDLINE

## (undated) DEVICE — E-Z CLEAN, NON-STICK, PTFE COATED, ELECTROSURGICAL BLADE ELECTRODE, MODIFIED EXTENDED INSULATION, 2.5 INCH (6.35 CM): Brand: MEGADYNE

## (undated) DEVICE — COVER LT HNDL BLU PLAS

## (undated) DEVICE — BRA SURG L FOR 38-40IN CHST 96% COT 4% SPANDEX KNIT FAB

## (undated) DEVICE — GRID BX L4.65IN RADLUC FOR SAFE IMAG TRNSPRT PROC BRST SPEC